# Patient Record
Sex: MALE | Race: WHITE | Employment: OTHER
[De-identification: names, ages, dates, MRNs, and addresses within clinical notes are randomized per-mention and may not be internally consistent; named-entity substitution may affect disease eponyms.]

---

## 2017-02-23 ENCOUNTER — OFFICE VISIT (OUTPATIENT)
Dept: INTERNAL MEDICINE | Facility: CLINIC | Age: 72
End: 2017-02-23

## 2017-02-23 VITALS
HEIGHT: 72 IN | DIASTOLIC BLOOD PRESSURE: 74 MMHG | BODY MASS INDEX: 21.14 KG/M2 | WEIGHT: 156.09 LBS | SYSTOLIC BLOOD PRESSURE: 122 MMHG

## 2017-02-23 DIAGNOSIS — E78.2 MIXED HYPERLIPIDEMIA: ICD-10-CM

## 2017-02-23 DIAGNOSIS — E11.65 TYPE 2 DIABETES MELLITUS WITH HYPERGLYCEMIA, UNSPECIFIED LONG TERM INSULIN USE STATUS: Primary | ICD-10-CM

## 2017-02-23 DIAGNOSIS — Z13.9 SCREENING: ICD-10-CM

## 2017-02-23 LAB
CHOLESTEROL, TOTAL: 148 MG/DL
CHOLESTEROL/HDL RATIO: 148
CREATININE URINE: 42.87 MG/DL
HDLC SERPL-MCNC: 49 MG/DL (ref 35–70)
LDL CHOLESTEROL CALCULATED: 89 MG/DL (ref 0–160)
MICROALBUMIN/CREAT 24H UR: 1.2 MG/G{CREAT}
TRIGL SERPL-MCNC: 49 MG/DL
VLDLC SERPL CALC-MCNC: NORMAL MG/DL

## 2017-02-23 PROCEDURE — 3045F PR MOST RECENT HEMOGLOBIN A1C LEVEL 7.0-9.0%: CPT | Performed by: INTERNAL MEDICINE

## 2017-02-23 PROCEDURE — 99213 OFFICE O/P EST LOW 20 MIN: CPT | Performed by: INTERNAL MEDICINE

## 2017-02-23 PROCEDURE — G8484 FLU IMMUNIZE NO ADMIN: HCPCS | Performed by: INTERNAL MEDICINE

## 2017-02-23 PROCEDURE — G8599 NO ASA/ANTIPLAT THER USE RNG: HCPCS | Performed by: INTERNAL MEDICINE

## 2017-02-23 PROCEDURE — 3017F COLORECTAL CA SCREEN DOC REV: CPT | Performed by: INTERNAL MEDICINE

## 2017-02-23 PROCEDURE — G8427 DOCREV CUR MEDS BY ELIG CLIN: HCPCS | Performed by: INTERNAL MEDICINE

## 2017-02-23 PROCEDURE — G8419 CALC BMI OUT NRM PARAM NOF/U: HCPCS | Performed by: INTERNAL MEDICINE

## 2017-02-23 PROCEDURE — 1123F ACP DISCUSS/DSCN MKR DOCD: CPT | Performed by: INTERNAL MEDICINE

## 2017-02-23 PROCEDURE — 4040F PNEUMOC VAC/ADMIN/RCVD: CPT | Performed by: INTERNAL MEDICINE

## 2017-02-23 PROCEDURE — 1036F TOBACCO NON-USER: CPT | Performed by: INTERNAL MEDICINE

## 2017-02-23 ASSESSMENT — PATIENT HEALTH QUESTIONNAIRE - PHQ9
SUM OF ALL RESPONSES TO PHQ9 QUESTIONS 1 & 2: 0
1. LITTLE INTEREST OR PLEASURE IN DOING THINGS: 0
SUM OF ALL RESPONSES TO PHQ QUESTIONS 1-9: 0
2. FEELING DOWN, DEPRESSED OR HOPELESS: 0

## 2017-02-28 DIAGNOSIS — E11.65 TYPE 2 DIABETES MELLITUS WITH HYPERGLYCEMIA, UNSPECIFIED LONG TERM INSULIN USE STATUS: ICD-10-CM

## 2017-02-28 DIAGNOSIS — E78.2 MIXED HYPERLIPIDEMIA: ICD-10-CM

## 2017-03-21 ENCOUNTER — APPOINTMENT (OUTPATIENT)
Dept: GENERAL RADIOLOGY | Age: 72
DRG: 870 | End: 2017-03-21
Payer: MEDICARE

## 2017-03-21 ENCOUNTER — APPOINTMENT (OUTPATIENT)
Dept: MRI IMAGING | Age: 72
DRG: 870 | End: 2017-03-21
Payer: MEDICARE

## 2017-03-21 ENCOUNTER — APPOINTMENT (OUTPATIENT)
Dept: CT IMAGING | Age: 72
DRG: 870 | End: 2017-03-21
Payer: MEDICARE

## 2017-03-21 ENCOUNTER — HOSPITAL ENCOUNTER (INPATIENT)
Age: 72
LOS: 15 days | Discharge: NURSING, REHAB OR LONG-TERM CARE | DRG: 870 | End: 2017-04-05
Attending: EMERGENCY MEDICINE | Admitting: INTERNAL MEDICINE
Payer: MEDICARE

## 2017-03-21 DIAGNOSIS — N39.0 URINARY TRACT INFECTION, SITE UNSPECIFIED: ICD-10-CM

## 2017-03-21 DIAGNOSIS — E87.20 LACTIC ACIDEMIA: ICD-10-CM

## 2017-03-21 DIAGNOSIS — E87.20 METABOLIC ACIDOSIS: ICD-10-CM

## 2017-03-21 DIAGNOSIS — Z97.8 ENDOTRACHEALLY INTUBATED: ICD-10-CM

## 2017-03-21 DIAGNOSIS — G40.901 STATUS EPILEPTICUS (HCC): Primary | ICD-10-CM

## 2017-03-21 DIAGNOSIS — E11.9 TYPE 2 DIABETES MELLITUS WITHOUT COMPLICATION, UNSPECIFIED LONG TERM INSULIN USE STATUS: ICD-10-CM

## 2017-03-21 DIAGNOSIS — E87.29 HIGH ANION GAP METABOLIC ACIDOSIS: ICD-10-CM

## 2017-03-21 DIAGNOSIS — R73.9 HYPERGLYCEMIA: ICD-10-CM

## 2017-03-21 PROBLEM — R56.9 SEIZURES (HCC): Status: ACTIVE | Noted: 2017-03-21

## 2017-03-21 PROBLEM — G93.41 METABOLIC ENCEPHALOPATHY: Status: ACTIVE | Noted: 2017-03-21

## 2017-03-21 PROBLEM — A41.9 SEPSIS (HCC): Status: ACTIVE | Noted: 2017-03-21

## 2017-03-21 LAB
% CKMB: 5.7 % (ref 0–3.5)
-: ABNORMAL
ABSOLUTE EOS #: 0 K/UL (ref 0–0.4)
ABSOLUTE EOS #: NORMAL K/UL
ABSOLUTE LYMPH #: 5.99 K/UL (ref 1–4.8)
ABSOLUTE LYMPH #: NORMAL K/UL
ABSOLUTE MONO #: 1.3 K/UL (ref 0.1–0.8)
ABSOLUTE MONO #: NORMAL K/UL
ACETAMINOPHEN LEVEL: <10 UG/ML (ref 10–30)
AMORPHOUS: ABNORMAL
AMPHETAMINE SCREEN URINE: NEGATIVE
ANION GAP SERPL CALCULATED.3IONS-SCNC: 20 MMOL/L (ref 9–17)
ANION GAP SERPL CALCULATED.3IONS-SCNC: 35 MMOL/L (ref 9–17)
ANION GAP: 34 MMOL/L (ref 8–16)
BACTERIA: ABNORMAL
BARBITURATE SCREEN URINE: NEGATIVE
BASOPHILS # BLD: 0 % (ref 0–2)
BASOPHILS # BLD: NORMAL % (ref 0–2)
BASOPHILS ABSOLUTE: 0 K/UL (ref 0–0.2)
BASOPHILS ABSOLUTE: NORMAL K/UL (ref 0–0.2)
BENZODIAZEPINE SCREEN, URINE: POSITIVE
BETA-HYDROXYBUTYRATE: 1.23 MMOL/L (ref 0.02–0.27)
BILIRUBIN URINE: NEGATIVE
BUN BLDV-MCNC: 8 MG/DL (ref 8–23)
BUN BLDV-MCNC: 9 MG/DL (ref 8–23)
BUN/CREAT BLD: ABNORMAL (ref 9–20)
BUN/CREAT BLD: ABNORMAL (ref 9–20)
BUPRENORPHINE URINE: ABNORMAL
CALCIUM SERPL-MCNC: 7.8 MG/DL (ref 8.6–10.4)
CALCIUM SERPL-MCNC: 8.4 MG/DL (ref 8.6–10.4)
CANNABINOID SCREEN URINE: POSITIVE
CARBOXYHEMOGLOBIN: 0 % (ref 0–5)
CASTS UA: ABNORMAL /LPF (ref 0–8)
CHLORIDE BLD-SCNC: 88 MMOL/L (ref 98–107)
CHLORIDE BLD-SCNC: 98 MMOL/L (ref 98–107)
CK MB: 2.6 NG/ML
CKMB INTERPRETATION: ABNORMAL
CO2: 12 MMOL/L (ref 20–31)
CO2: 19 MMOL/L (ref 20–31)
COCAINE METABOLITE, URINE: NEGATIVE
COLOR: YELLOW
COMMENT UA: ABNORMAL
CREAT SERPL-MCNC: 0.64 MG/DL (ref 0.7–1.2)
CREAT SERPL-MCNC: 1.01 MG/DL (ref 0.7–1.2)
CRYSTALS, UA: ABNORMAL /HPF
DIFFERENTIAL TYPE: ABNORMAL
DIFFERENTIAL TYPE: NORMAL
EOSINOPHILS RELATIVE PERCENT: 0 % (ref 1–4)
EOSINOPHILS RELATIVE PERCENT: NORMAL %
EPITHELIAL CELLS UA: ABNORMAL /HPF (ref 0–5)
ETHANOL PERCENT: <0.01 %
ETHANOL: <10 MG/DL
FIO2: 1
FIO2: 40
FIO2: ABNORMAL
GFR AFRICAN AMERICAN: >60 ML/MIN
GFR AFRICAN AMERICAN: >60 ML/MIN
GFR NON-AFRICAN AMERICAN: >60 ML/MIN
GFR NON-AFRICAN AMERICAN: >60 ML/MIN
GFR SERPL CREATININE-BSD FRML MDRD: ABNORMAL ML/MIN/{1.73_M2}
GLUCOSE BLD-MCNC: 234 MG/DL (ref 75–110)
GLUCOSE BLD-MCNC: 292 MG/DL (ref 75–110)
GLUCOSE BLD-MCNC: 297 MG/DL (ref 70–99)
GLUCOSE BLD-MCNC: 348 MG/DL (ref 75–110)
GLUCOSE BLD-MCNC: 450 MG/DL (ref 70–99)
GLUCOSE BLD-MCNC: 454 MG/DL (ref 74–106)
GLUCOSE URINE: ABNORMAL
HCO3 VENOUS: 6.7 MMOL/L (ref 24–30)
HCO3 VENOUS: 6.9 MMOL/L (ref 24–30)
HCT VFR BLD CALC: 42 % (ref 41–53)
HCT VFR BLD CALC: 42.9 % (ref 41–53)
HCT VFR BLD CALC: 52.6 % (ref 41–53)
HCT VFR BLD CALC: NORMAL %
HEMOGLOBIN: 14.7 G/DL (ref 13.5–17.5)
HEMOGLOBIN: 14.9 G/DL (ref 13.5–17.5)
HEMOGLOBIN: 16.5 G/DL (ref 13.5–17.5)
HEMOGLOBIN: NORMAL G/DL
INR BLD: 1.1
INR BLD: NORMAL
KETONES, URINE: NEGATIVE
LACTIC ACID, WHOLE BLOOD: 3 MMOL/L (ref 0.7–2.1)
LACTIC ACID, WHOLE BLOOD: 7.5 MMOL/L (ref 0.7–2.1)
LACTIC ACID: ABNORMAL MMOL/L
LEUKOCYTE ESTERASE, URINE: ABNORMAL
LYMPHOCYTES # BLD: 37 % (ref 24–44)
LYMPHOCYTES # BLD: NORMAL %
MAGNESIUM: 2.7 MG/DL (ref 1.6–2.6)
MCH RBC QN AUTO: 29.9 PG (ref 26–34)
MCH RBC QN AUTO: NORMAL PG (ref 26–34)
MCHC RBC AUTO-ENTMCNC: 31.3 G/DL (ref 31–37)
MCHC RBC AUTO-ENTMCNC: NORMAL G/DL (ref 31–37)
MCV RBC AUTO: 95.4 FL (ref 80–100)
MCV RBC AUTO: NORMAL FL (ref 80–100)
MDMA URINE: ABNORMAL
METHADONE SCREEN, URINE: NEGATIVE
METHAMPHETAMINE, URINE: ABNORMAL
MONOCYTES # BLD: 8 % (ref 1–7)
MONOCYTES # BLD: NORMAL %
MORPHOLOGY: ABNORMAL
MUCUS: ABNORMAL
MYOGLOBIN: 258 NG/ML (ref 28–72)
NEGATIVE BASE EXCESS, ART: 19 (ref 0–2)
NEGATIVE BASE EXCESS, ART: 6 (ref 0–2)
NEGATIVE BASE EXCESS, VEN: 28 (ref 0–2)
NEGATIVE BASE EXCESS, VEN: 29 (ref 0–2)
NITRITE, URINE: POSITIVE
O2 DEVICE/FLOW/%: ABNORMAL
O2 SAT, VEN: 80 %
OPIATES, URINE: NEGATIVE
OTHER OBSERVATIONS UA: ABNORMAL
OXYCODONE SCREEN URINE: NEGATIVE
PARTIAL THROMBOPLASTIN TIME: 29.1 SEC (ref 21.3–31.3)
PARTIAL THROMBOPLASTIN TIME: NORMAL SEC
PATIENT TEMP: ABNORMAL
PCO2, VEN: 49 MM HG (ref 39–55)
PCO2, VEN: 52 MM HG (ref 39–55)
PDW BLD-RTO: 16.2 % (ref 12.5–15.4)
PDW BLD-RTO: NORMAL %
PH UA: 5.5 (ref 5–8)
PH VENOUS: 6.72 (ref 7.32–7.42)
PH VENOUS: 6.76 (ref 7.32–7.42)
PHENCYCLIDINE, URINE: NEGATIVE
PHENYTOIN DATE LAST DOSE: ABNORMAL
PHENYTOIN DOSE AMOUNT: ABNORMAL
PHENYTOIN DOSE TIME: ABNORMAL
PHENYTOIN LEVEL: <0.8 UG/ML (ref 10–20)
PHOSPHORUS: 1.9 MG/DL (ref 2.5–4.5)
PLATELET # BLD: 286 K/UL (ref 140–450)
PLATELET # BLD: NORMAL K/UL
PLATELET ESTIMATE: ABNORMAL
PLATELET ESTIMATE: NORMAL
PMV BLD AUTO: 13.1 FL (ref 6–12)
PMV BLD AUTO: NORMAL FL (ref 6–12)
PO2, VEN: 90 MM HG (ref 30–50)
POC BUN: 12 MG/DL (ref 6–20)
POC CHLORIDE: 97 MMOL/L (ref 98–110)
POC CREATININE: 0.9 MG/DL (ref 0.6–1.4)
POC HCO3: 13.2 MMOL/L (ref 22–27)
POC HCO3: 19.7 MMOL/L (ref 22–27)
POC HEMATOCRIT: 54 % (ref 41–53)
POC HEMOGLOBIN: 18.4 GM/DL (ref 13.5–17.5)
POC LACTIC ACID, VEN: >20 MMOL/L (ref 0.9–1.7)
POC O2 SATURATION: 100 %
POC O2 SATURATION: 86 %
POC PCO2 TEMP: ABNORMAL MM HG
POC PCO2: 36 MM HG (ref 32–45)
POC PCO2: 62 MM HG (ref 32–45)
POC PH TEMP: ABNORMAL
POC PH: 6.94 (ref 7.35–7.45)
POC PH: 7.34 (ref 7.35–7.45)
POC PO2 TEMP: ABNORMAL MM HG
POC PO2: 54 MM HG (ref 75–95)
POC PO2: 543 MM HG (ref 75–95)
POC POTASSIUM: 5.6 MMOL/L (ref 3.5–5.1)
POC SODIUM: 132 MMOL/L (ref 136–145)
POC TROPONIN I: 0.02 NG/ML (ref 0–0.1)
POC TROPONIN INTERP: NORMAL
POSITIVE BASE EXCESS, ART: ABNORMAL (ref 0–2)
POSITIVE BASE EXCESS, ART: ABNORMAL (ref 0–2)
POSITIVE BASE EXCESS, VEN: ABNORMAL (ref 0–2)
POSITIVE BASE EXCESS, VEN: ABNORMAL (ref 0–2)
POTASSIUM SERPL-SCNC: 3.2 MMOL/L (ref 3.7–5.3)
POTASSIUM SERPL-SCNC: 3.8 MMOL/L (ref 3.7–5.3)
PROPOXYPHENE, URINE: ABNORMAL
PROTEIN UA: ABNORMAL
PROTHROMBIN TIME: 11.7 SEC (ref 9.4–12.6)
PROTHROMBIN TIME: NORMAL SEC
RBC # BLD: 5.52 M/UL (ref 4.5–5.9)
RBC # BLD: ABNORMAL 10*6/UL
RBC # BLD: NORMAL 10*6/UL
RBC # BLD: NORMAL M/UL (ref 4.5–5.9)
RBC UA: ABNORMAL /HPF (ref 0–4)
RENAL EPITHELIAL, UA: ABNORMAL /HPF
SALICYLATE LEVEL: <1 MG/DL (ref 3–10)
SEG NEUTROPHILS: 55 % (ref 36–66)
SEG NEUTROPHILS: NORMAL %
SEGMENTED NEUTROPHILS ABSOLUTE COUNT: 8.91 K/UL (ref 1.8–7.7)
SEGMENTED NEUTROPHILS ABSOLUTE COUNT: NORMAL K/UL
SERUM OSMOLALITY: 316 MOSM/KG (ref 275–295)
SODIUM BLD-SCNC: 135 MMOL/L (ref 135–144)
SODIUM BLD-SCNC: 137 MMOL/L (ref 135–144)
SPECIFIC GRAVITY UA: 1.02 (ref 1–1.03)
TCO2 (CALC), ART: 15 MM HG (ref 23–28)
TCO2 (CALC), ART: 21 MM HG (ref 23–28)
TEST INFORMATION: ABNORMAL
TOTAL CK: 46 U/L (ref 39–308)
TOTAL CO2, VENOUS: 8 MM HG (ref 25–31)
TOTAL CO2, VENOUS: 8 MM HG (ref 25–31)
TOXIC TRICYCLIC SC,BLOOD: NEGATIVE
TRICHOMONAS: ABNORMAL
TRICYCLIC ANTIDEPRESSANTS, UR: ABNORMAL
TROPONIN INTERP: NORMAL
TROPONIN T: <0.03 NG/ML
TURBIDITY: CLEAR
URINE HGB: ABNORMAL
UROBILINOGEN, URINE: NORMAL
WBC # BLD: 16.2 K/UL (ref 3.5–11)
WBC # BLD: ABNORMAL 10*3/UL
WBC # BLD: NORMAL 10*3/UL
WBC # BLD: NORMAL K/UL
WBC UA: ABNORMAL /HPF (ref 0–5)
YEAST: ABNORMAL

## 2017-03-21 PROCEDURE — 84100 ASSAY OF PHOSPHORUS: CPT

## 2017-03-21 PROCEDURE — 82803 BLOOD GASES ANY COMBINATION: CPT

## 2017-03-21 PROCEDURE — 80186 ASSAY OF PHENYTOIN FREE: CPT

## 2017-03-21 PROCEDURE — 2580000003 HC RX 258

## 2017-03-21 PROCEDURE — 6360000002 HC RX W HCPCS: Performed by: EMERGENCY MEDICINE

## 2017-03-21 PROCEDURE — 87086 URINE CULTURE/COLONY COUNT: CPT

## 2017-03-21 PROCEDURE — 99291 CRITICAL CARE FIRST HOUR: CPT | Performed by: INTERNAL MEDICINE

## 2017-03-21 PROCEDURE — 83735 ASSAY OF MAGNESIUM: CPT

## 2017-03-21 PROCEDURE — 82010 KETONE BODYS QUAN: CPT

## 2017-03-21 PROCEDURE — C9113 INJ PANTOPRAZOLE SODIUM, VIA: HCPCS | Performed by: INTERNAL MEDICINE

## 2017-03-21 PROCEDURE — 95951 HC EEG MONITORING VIDEO RECORDING: CPT

## 2017-03-21 PROCEDURE — 6360000002 HC RX W HCPCS

## 2017-03-21 PROCEDURE — 85014 HEMATOCRIT: CPT

## 2017-03-21 PROCEDURE — 85610 PROTHROMBIN TIME: CPT

## 2017-03-21 PROCEDURE — 6370000000 HC RX 637 (ALT 250 FOR IP): Performed by: INTERNAL MEDICINE

## 2017-03-21 PROCEDURE — 6360000002 HC RX W HCPCS: Performed by: PSYCHIATRY & NEUROLOGY

## 2017-03-21 PROCEDURE — 82565 ASSAY OF CREATININE: CPT

## 2017-03-21 PROCEDURE — 85025 COMPLETE CBC W/AUTO DIFF WBC: CPT

## 2017-03-21 PROCEDURE — 87040 BLOOD CULTURE FOR BACTERIA: CPT

## 2017-03-21 PROCEDURE — 36600 WITHDRAWAL OF ARTERIAL BLOOD: CPT

## 2017-03-21 PROCEDURE — 2500000003 HC RX 250 WO HCPCS: Performed by: EMERGENCY MEDICINE

## 2017-03-21 PROCEDURE — 80307 DRUG TEST PRSMV CHEM ANLYZR: CPT

## 2017-03-21 PROCEDURE — 85730 THROMBOPLASTIN TIME PARTIAL: CPT

## 2017-03-21 PROCEDURE — 2580000003 HC RX 258: Performed by: INTERNAL MEDICINE

## 2017-03-21 PROCEDURE — 82550 ASSAY OF CK (CPK): CPT

## 2017-03-21 PROCEDURE — 72125 CT NECK SPINE W/O DYE: CPT

## 2017-03-21 PROCEDURE — 85018 HEMOGLOBIN: CPT

## 2017-03-21 PROCEDURE — 99222 1ST HOSP IP/OBS MODERATE 55: CPT | Performed by: PSYCHIATRY & NEUROLOGY

## 2017-03-21 PROCEDURE — 94762 N-INVAS EAR/PLS OXIMTRY CONT: CPT

## 2017-03-21 PROCEDURE — 70551 MRI BRAIN STEM W/O DYE: CPT

## 2017-03-21 PROCEDURE — 70496 CT ANGIOGRAPHY HEAD: CPT

## 2017-03-21 PROCEDURE — 2580000003 HC RX 258: Performed by: EMERGENCY MEDICINE

## 2017-03-21 PROCEDURE — 74000 XR ABDOMEN LIMITED (KUB): CPT

## 2017-03-21 PROCEDURE — 70450 CT HEAD/BRAIN W/O DYE: CPT

## 2017-03-21 PROCEDURE — 36415 COLL VENOUS BLD VENIPUNCTURE: CPT

## 2017-03-21 PROCEDURE — 2580000003 HC RX 258: Performed by: PSYCHIATRY & NEUROLOGY

## 2017-03-21 PROCEDURE — 84520 ASSAY OF UREA NITROGEN: CPT

## 2017-03-21 PROCEDURE — 84484 ASSAY OF TROPONIN QUANT: CPT

## 2017-03-21 PROCEDURE — 96375 TX/PRO/DX INJ NEW DRUG ADDON: CPT

## 2017-03-21 PROCEDURE — 2500000003 HC RX 250 WO HCPCS

## 2017-03-21 PROCEDURE — 6360000002 HC RX W HCPCS: Performed by: INTERNAL MEDICINE

## 2017-03-21 PROCEDURE — 87483 CNS DNA AMP PROBE TYPE 12-25: CPT

## 2017-03-21 PROCEDURE — G0480 DRUG TEST DEF 1-7 CLASSES: HCPCS

## 2017-03-21 PROCEDURE — 86403 PARTICLE AGGLUT ANTBDY SCRN: CPT

## 2017-03-21 PROCEDURE — 81001 URINALYSIS AUTO W/SCOPE: CPT

## 2017-03-21 PROCEDURE — 87186 SC STD MICRODIL/AGAR DIL: CPT

## 2017-03-21 PROCEDURE — 71010 XR CHEST PORTABLE: CPT

## 2017-03-21 PROCEDURE — 94770 HC ETCO2 MONITOR DAILY: CPT

## 2017-03-21 PROCEDURE — 84132 ASSAY OF SERUM POTASSIUM: CPT

## 2017-03-21 PROCEDURE — 82435 ASSAY OF BLOOD CHLORIDE: CPT

## 2017-03-21 PROCEDURE — 96365 THER/PROPH/DIAG IV INF INIT: CPT

## 2017-03-21 PROCEDURE — 80048 BASIC METABOLIC PNL TOTAL CA: CPT

## 2017-03-21 PROCEDURE — 70498 CT ANGIOGRAPHY NECK: CPT

## 2017-03-21 PROCEDURE — 6360000004 HC RX CONTRAST MEDICATION: Performed by: EMERGENCY MEDICINE

## 2017-03-21 PROCEDURE — 84295 ASSAY OF SERUM SODIUM: CPT

## 2017-03-21 PROCEDURE — 80185 ASSAY OF PHENYTOIN TOTAL: CPT

## 2017-03-21 PROCEDURE — 83605 ASSAY OF LACTIC ACID: CPT

## 2017-03-21 PROCEDURE — 83874 ASSAY OF MYOGLOBIN: CPT

## 2017-03-21 PROCEDURE — 94002 VENT MGMT INPAT INIT DAY: CPT

## 2017-03-21 PROCEDURE — 93005 ELECTROCARDIOGRAM TRACING: CPT

## 2017-03-21 PROCEDURE — 31500 INSERT EMERGENCY AIRWAY: CPT

## 2017-03-21 PROCEDURE — 82947 ASSAY GLUCOSE BLOOD QUANT: CPT

## 2017-03-21 PROCEDURE — 2000000000 HC ICU R&B

## 2017-03-21 PROCEDURE — 82553 CREATINE MB FRACTION: CPT

## 2017-03-21 PROCEDURE — 82375 ASSAY CARBOXYHB QUANT: CPT

## 2017-03-21 PROCEDURE — 99223 1ST HOSP IP/OBS HIGH 75: CPT | Performed by: PSYCHIATRY & NEUROLOGY

## 2017-03-21 PROCEDURE — 99285 EMERGENCY DEPT VISIT HI MDM: CPT

## 2017-03-21 PROCEDURE — 83930 ASSAY OF BLOOD OSMOLALITY: CPT

## 2017-03-21 PROCEDURE — 85027 COMPLETE CBC AUTOMATED: CPT

## 2017-03-21 RX ORDER — PHENYTOIN SODIUM 50 MG/ML
100 INJECTION, SOLUTION INTRAMUSCULAR; INTRAVENOUS EVERY 8 HOURS
Status: DISCONTINUED | OUTPATIENT
Start: 2017-03-21 | End: 2017-04-02

## 2017-03-21 RX ORDER — PROPOFOL 10 MG/ML
INJECTION, EMULSION INTRAVENOUS
Status: COMPLETED
Start: 2017-03-21 | End: 2017-03-21

## 2017-03-21 RX ORDER — 0.9 % SODIUM CHLORIDE 0.9 %
2000 INTRAVENOUS SOLUTION INTRAVENOUS ONCE
Status: COMPLETED | OUTPATIENT
Start: 2017-03-21 | End: 2017-03-22

## 2017-03-21 RX ORDER — SODIUM CHLORIDE 0.9 % (FLUSH) 0.9 %
10 SYRINGE (ML) INJECTION EVERY 12 HOURS SCHEDULED
Status: DISCONTINUED | OUTPATIENT
Start: 2017-03-21 | End: 2017-04-05 | Stop reason: HOSPADM

## 2017-03-21 RX ORDER — 0.9 % SODIUM CHLORIDE 0.9 %
15 INTRAVENOUS SOLUTION INTRAVENOUS ONCE
Status: COMPLETED | OUTPATIENT
Start: 2017-03-21 | End: 2017-03-21

## 2017-03-21 RX ORDER — ROCURONIUM BROMIDE 10 MG/ML
100 INJECTION, SOLUTION INTRAVENOUS ONCE
Status: COMPLETED | OUTPATIENT
Start: 2017-03-21 | End: 2017-03-21

## 2017-03-21 RX ORDER — POTASSIUM CHLORIDE 7.45 MG/ML
10 INJECTION INTRAVENOUS PRN
Status: DISCONTINUED | OUTPATIENT
Start: 2017-03-21 | End: 2017-03-30

## 2017-03-21 RX ORDER — HEPARIN SODIUM 5000 [USP'U]/ML
5000 INJECTION, SOLUTION INTRAVENOUS; SUBCUTANEOUS EVERY 8 HOURS SCHEDULED
Status: DISCONTINUED | OUTPATIENT
Start: 2017-03-21 | End: 2017-03-31

## 2017-03-21 RX ORDER — PROPOFOL 10 MG/ML
10 INJECTION, EMULSION INTRAVENOUS
Status: DISCONTINUED | OUTPATIENT
Start: 2017-03-21 | End: 2017-03-23

## 2017-03-21 RX ORDER — DEXTROSE MONOHYDRATE 25 G/50ML
12.5 INJECTION, SOLUTION INTRAVENOUS PRN
Status: DISCONTINUED | OUTPATIENT
Start: 2017-03-21 | End: 2017-04-05 | Stop reason: HOSPADM

## 2017-03-21 RX ORDER — FENTANYL CITRATE 50 UG/ML
25 INJECTION, SOLUTION INTRAMUSCULAR; INTRAVENOUS
Status: DISCONTINUED | OUTPATIENT
Start: 2017-03-21 | End: 2017-03-22

## 2017-03-21 RX ORDER — MIDAZOLAM HYDROCHLORIDE 5 MG/ML
INJECTION INTRAMUSCULAR; INTRAVENOUS
Status: DISPENSED
Start: 2017-03-21 | End: 2017-03-22

## 2017-03-21 RX ORDER — LORAZEPAM 2 MG/ML
2 INJECTION INTRAMUSCULAR PRN
Status: DISCONTINUED | OUTPATIENT
Start: 2017-03-21 | End: 2017-04-02

## 2017-03-21 RX ORDER — LORAZEPAM 2 MG/ML
1 INJECTION INTRAMUSCULAR PRN
Status: DISCONTINUED | OUTPATIENT
Start: 2017-03-21 | End: 2017-04-05 | Stop reason: HOSPADM

## 2017-03-21 RX ORDER — SODIUM CHLORIDE 9 MG/ML
INJECTION, SOLUTION INTRAVENOUS CONTINUOUS
Status: DISCONTINUED | OUTPATIENT
Start: 2017-03-21 | End: 2017-03-23

## 2017-03-21 RX ORDER — SODIUM CHLORIDE 0.9 % (FLUSH) 0.9 %
10 SYRINGE (ML) INJECTION PRN
Status: DISCONTINUED | OUTPATIENT
Start: 2017-03-21 | End: 2017-04-05 | Stop reason: HOSPADM

## 2017-03-21 RX ORDER — 0.9 % SODIUM CHLORIDE 0.9 %
30 INTRAVENOUS SOLUTION INTRAVENOUS ONCE
Status: COMPLETED | OUTPATIENT
Start: 2017-03-21 | End: 2017-03-21

## 2017-03-21 RX ORDER — ONDANSETRON 2 MG/ML
4 INJECTION INTRAMUSCULAR; INTRAVENOUS EVERY 6 HOURS PRN
Status: DISCONTINUED | OUTPATIENT
Start: 2017-03-21 | End: 2017-04-05 | Stop reason: HOSPADM

## 2017-03-21 RX ORDER — FENTANYL CITRATE 50 UG/ML
50 INJECTION, SOLUTION INTRAMUSCULAR; INTRAVENOUS
Status: DISCONTINUED | OUTPATIENT
Start: 2017-03-21 | End: 2017-03-22

## 2017-03-21 RX ORDER — VECURONIUM BROMIDE 1 MG/ML
5 INJECTION, POWDER, LYOPHILIZED, FOR SOLUTION INTRAVENOUS ONCE
Status: DISCONTINUED | OUTPATIENT
Start: 2017-03-21 | End: 2017-03-28

## 2017-03-21 RX ORDER — MIDAZOLAM HYDROCHLORIDE 5 MG/ML
10 INJECTION INTRAMUSCULAR; INTRAVENOUS ONCE
Status: COMPLETED | OUTPATIENT
Start: 2017-03-21 | End: 2017-03-21

## 2017-03-21 RX ORDER — DEXTROSE AND SODIUM CHLORIDE 5; .45 G/100ML; G/100ML
INJECTION, SOLUTION INTRAVENOUS CONTINUOUS PRN
Status: DISCONTINUED | OUTPATIENT
Start: 2017-03-21 | End: 2017-03-22

## 2017-03-21 RX ORDER — SODIUM CHLORIDE 9 MG/ML
INJECTION, SOLUTION INTRAVENOUS
Status: COMPLETED
Start: 2017-03-21 | End: 2017-03-21

## 2017-03-21 RX ORDER — LEVOFLOXACIN 5 MG/ML
750 INJECTION, SOLUTION INTRAVENOUS EVERY 24 HOURS
Status: DISCONTINUED | OUTPATIENT
Start: 2017-03-21 | End: 2017-03-25

## 2017-03-21 RX ADMIN — MIDAZOLAM HYDROCHLORIDE 10 MG: 5 INJECTION, SOLUTION INTRAMUSCULAR; INTRAVENOUS at 15:05

## 2017-03-21 RX ADMIN — HYDROXOCOBALAMIN 5 G: 5 INJECTION, POWDER, LYOPHILIZED, FOR SOLUTION INTRAVENOUS at 16:13

## 2017-03-21 RX ADMIN — SODIUM CHLORIDE 1089 ML: 9 INJECTION, SOLUTION INTRAVENOUS at 22:16

## 2017-03-21 RX ADMIN — LEVETIRACETAM 500 MG: 500 INJECTION, SOLUTION, CONCENTRATE INTRAVENOUS at 22:22

## 2017-03-21 RX ADMIN — SODIUM CHLORIDE: 9 INJECTION, SOLUTION INTRAVENOUS at 16:14

## 2017-03-21 RX ADMIN — LEVETIRACETAM 1000 MG: 500 INJECTION, SOLUTION, CONCENTRATE INTRAVENOUS at 15:49

## 2017-03-21 RX ADMIN — Medication 1 MG/HR: at 17:00

## 2017-03-21 RX ADMIN — ROCURONIUM BROMIDE 100 MG: 10 INJECTION INTRAVENOUS at 15:00

## 2017-03-21 RX ADMIN — PROPOFOL 25 MCG/KG/MIN: 10 INJECTION, EMULSION INTRAVENOUS at 18:00

## 2017-03-21 RX ADMIN — Medication 10 ML: at 21:03

## 2017-03-21 RX ADMIN — SODIUM CHLORIDE: 9 INJECTION, SOLUTION INTRAVENOUS at 22:16

## 2017-03-21 RX ADMIN — IOHEXOL 90 ML: 350 INJECTION, SOLUTION INTRAVENOUS at 15:45

## 2017-03-21 RX ADMIN — SODIUM CHLORIDE 80 MG: 9 INJECTION, SOLUTION INTRAVENOUS at 21:07

## 2017-03-21 RX ADMIN — FENTANYL CITRATE 50 MCG: 50 INJECTION, SOLUTION INTRAMUSCULAR; INTRAVENOUS at 21:20

## 2017-03-21 RX ADMIN — SODIUM CHLORIDE 8.64 UNITS/HR: 9 INJECTION, SOLUTION INTRAVENOUS at 21:00

## 2017-03-21 RX ADMIN — Medication 10 ML: at 21:00

## 2017-03-21 RX ADMIN — SODIUM BICARBONATE 50 MEQ: 84 INJECTION PARENTERAL at 15:19

## 2017-03-21 RX ADMIN — SODIUM CHLORIDE 8 MG/HR: 9 INJECTION, SOLUTION INTRAVENOUS at 21:31

## 2017-03-21 RX ADMIN — SODIUM CHLORIDE 2178 ML: 9 INJECTION, SOLUTION INTRAVENOUS at 17:55

## 2017-03-21 RX ADMIN — PROPOFOL 40 MCG/KG/MIN: 10 INJECTION, EMULSION INTRAVENOUS at 21:41

## 2017-03-21 RX ADMIN — SODIUM BICARBONATE: 84 INJECTION PARENTERAL at 15:31

## 2017-03-21 ASSESSMENT — PULMONARY FUNCTION TESTS
PIF_VALUE: 17
PIF_VALUE: 20
PIF_VALUE: 9
PIF_VALUE: 15

## 2017-03-22 ENCOUNTER — APPOINTMENT (OUTPATIENT)
Dept: GENERAL RADIOLOGY | Age: 72
DRG: 870 | End: 2017-03-22
Payer: MEDICARE

## 2017-03-22 ENCOUNTER — APPOINTMENT (OUTPATIENT)
Dept: INTERVENTIONAL RADIOLOGY/VASCULAR | Age: 72
DRG: 870 | End: 2017-03-22
Payer: MEDICARE

## 2017-03-22 LAB
ABSOLUTE EOS #: 0 K/UL (ref 0–0.4)
ABSOLUTE LYMPH #: 1.74 K/UL (ref 1–4.8)
ABSOLUTE MONO #: 1.16 K/UL (ref 0.1–0.8)
ALBUMIN CSF: 648 MG/L (ref 70–350)
ALBUMIN INDEX: 166.2
ALBUMIN SERPL-MCNC: 3.9 G/DL (ref 3.5–5.2)
ANION GAP SERPL CALCULATED.3IONS-SCNC: 14 MMOL/L (ref 9–17)
ANION GAP SERPL CALCULATED.3IONS-SCNC: 16 MMOL/L (ref 9–17)
ANION GAP SERPL CALCULATED.3IONS-SCNC: 17 MMOL/L (ref 9–17)
ANION GAP SERPL CALCULATED.3IONS-SCNC: 18 MMOL/L (ref 9–17)
APPEARANCE CSF: CLEAR
BASOPHILS # BLD: 1 % (ref 0–2)
BASOPHILS ABSOLUTE: 0.19 K/UL (ref 0–0.2)
BUN BLDV-MCNC: 3 MG/DL (ref 8–23)
BUN BLDV-MCNC: 4 MG/DL (ref 8–23)
BUN BLDV-MCNC: 4 MG/DL (ref 8–23)
BUN BLDV-MCNC: 6 MG/DL (ref 8–23)
BUN/CREAT BLD: ABNORMAL (ref 9–20)
CALCIUM IONIZED: 1 MMOL/L (ref 1.13–1.33)
CALCIUM SERPL-MCNC: 7.5 MG/DL (ref 8.6–10.4)
CALCIUM SERPL-MCNC: 7.6 MG/DL (ref 8.6–10.4)
CALCIUM SERPL-MCNC: 7.7 MG/DL (ref 8.6–10.4)
CALCIUM SERPL-MCNC: 7.8 MG/DL (ref 8.6–10.4)
CHLORIDE BLD-SCNC: 100 MMOL/L (ref 98–107)
CHLORIDE BLD-SCNC: 101 MMOL/L (ref 98–107)
CHLORIDE BLD-SCNC: 98 MMOL/L (ref 98–107)
CHLORIDE BLD-SCNC: 99 MMOL/L (ref 98–107)
CO2: 22 MMOL/L (ref 20–31)
CO2: 22 MMOL/L (ref 20–31)
CO2: 23 MMOL/L (ref 20–31)
CO2: 23 MMOL/L (ref 20–31)
CREAT SERPL-MCNC: 0.44 MG/DL (ref 0.7–1.2)
CREAT SERPL-MCNC: 0.53 MG/DL (ref 0.7–1.2)
CREAT SERPL-MCNC: 0.54 MG/DL (ref 0.7–1.2)
CREAT SERPL-MCNC: 0.57 MG/DL (ref 0.7–1.2)
CRYPTOCOCCUS NEOFORMANS/GATTI CSF FILM ARR.: NOT DETECTED
CYTOMEGALOVIRUS (CMV) CSF FILM ARRAY: NOT DETECTED
DIFFERENTIAL TYPE: ABNORMAL
DIRECT EXAM: NORMAL
EKG ATRIAL RATE: 138 BPM
EKG ATRIAL RATE: 57 BPM
EKG ATRIAL RATE: 85 BPM
EKG P AXIS: 81 DEGREES
EKG P AXIS: 82 DEGREES
EKG P-R INTERVAL: 160 MS
EKG P-R INTERVAL: 168 MS
EKG P-R INTERVAL: 170 MS
EKG Q-T INTERVAL: 268 MS
EKG Q-T INTERVAL: 408 MS
EKG Q-T INTERVAL: 418 MS
EKG QRS DURATION: 82 MS
EKG QRS DURATION: 84 MS
EKG QRS DURATION: 96 MS
EKG QTC CALCULATION (BAZETT): 260 MS
EKG QTC CALCULATION (BAZETT): 497 MS
EKG QTC CALCULATION (BAZETT): 618 MS
EKG R AXIS: 50 DEGREES
EKG R AXIS: 53 DEGREES
EKG R AXIS: 55 DEGREES
EKG T AXIS: 107 DEGREES
EKG T AXIS: 114 DEGREES
EKG T AXIS: 83 DEGREES
EKG VENTRICULAR RATE: 138 BPM
EKG VENTRICULAR RATE: 57 BPM
EKG VENTRICULAR RATE: 85 BPM
ENTEROVIRUS CSF FILM ARRAY: NOT DETECTED
EOSINOPHILS RELATIVE PERCENT: 0 % (ref 1–4)
ESCHERICHIA COLI K1 CSF FILM ARRAY: NOT DETECTED
FIO2: 60
GFR AFRICAN AMERICAN: >60 ML/MIN
GFR NON-AFRICAN AMERICAN: >60 ML/MIN
GFR SERPL CREATININE-BSD FRML MDRD: ABNORMAL ML/MIN/{1.73_M2}
GLUCOSE BLD-MCNC: 116 MG/DL (ref 75–110)
GLUCOSE BLD-MCNC: 135 MG/DL (ref 75–110)
GLUCOSE BLD-MCNC: 148 MG/DL (ref 75–110)
GLUCOSE BLD-MCNC: 153 MG/DL (ref 75–110)
GLUCOSE BLD-MCNC: 161 MG/DL (ref 75–110)
GLUCOSE BLD-MCNC: 165 MG/DL (ref 75–110)
GLUCOSE BLD-MCNC: 167 MG/DL (ref 75–110)
GLUCOSE BLD-MCNC: 171 MG/DL (ref 75–110)
GLUCOSE BLD-MCNC: 173 MG/DL (ref 70–99)
GLUCOSE BLD-MCNC: 176 MG/DL (ref 75–110)
GLUCOSE BLD-MCNC: 177 MG/DL (ref 75–110)
GLUCOSE BLD-MCNC: 181 MG/DL (ref 75–110)
GLUCOSE BLD-MCNC: 183 MG/DL (ref 75–110)
GLUCOSE BLD-MCNC: 193 MG/DL (ref 75–110)
GLUCOSE BLD-MCNC: 198 MG/DL (ref 75–110)
GLUCOSE BLD-MCNC: 211 MG/DL (ref 70–99)
GLUCOSE BLD-MCNC: 218 MG/DL (ref 70–99)
GLUCOSE BLD-MCNC: 228 MG/DL (ref 70–99)
GLUCOSE BLD-MCNC: 84 MG/DL (ref 75–110)
GLUCOSE, CSF: 119 MG/DL (ref 40–70)
HAEMOPHILUS INFLUENZA CSF FILM ARRAY: NOT DETECTED
HCT VFR BLD CALC: 41.5 % (ref 41–53)
HCT VFR BLD CALC: 43.4 % (ref 41–53)
HCT VFR BLD CALC: 43.8 % (ref 41–53)
HEMOGLOBIN: 14.3 G/DL (ref 13.5–17.5)
HEMOGLOBIN: 15.1 G/DL (ref 13.5–17.5)
HEMOGLOBIN: 15.1 G/DL (ref 13.5–17.5)
HHV-6 (HERPESVIRUS 6) CSF FILM ARRAY: NOT DETECTED
HSV-1 CSF FILM ARRAY: NOT DETECTED
HSV-2 CSF FILM ARRAY: NOT DETECTED
IGG CSF: 6.6 MG/DL (ref 0.5–7)
IGG INDEX CSF: 0.58
IGG SYNTHESIS RATE CSF: 5.7 MG/24 H
IGG: 684 MG/DL (ref 700–1600)
LACTIC ACID, WHOLE BLOOD: 2 MMOL/L (ref 0.7–2.1)
LACTIC ACID, WHOLE BLOOD: 2.1 MMOL/L (ref 0.7–2.1)
LISTERIA MONOCYTOGENES CSF FILM ARRAY: NOT DETECTED
LYMPHOCYTES # BLD: 9 % (ref 24–44)
Lab: NORMAL
Lab: NORMAL
MAGNESIUM: 1.8 MG/DL (ref 1.6–2.6)
MAGNESIUM: 2 MG/DL (ref 1.6–2.6)
MAGNESIUM: 2.1 MG/DL (ref 1.6–2.6)
MAGNESIUM: 2.1 MG/DL (ref 1.6–2.6)
MAGNESIUM: 2.3 MG/DL (ref 1.6–2.6)
MCH RBC QN AUTO: 30.3 PG (ref 26–34)
MCHC RBC AUTO-ENTMCNC: 34.5 G/DL (ref 31–37)
MCV RBC AUTO: 87.9 FL (ref 80–100)
MONOCYTES # BLD: 6 % (ref 1–7)
MORPHOLOGY: NORMAL
NEGATIVE BASE EXCESS, ART: ABNORMAL (ref 0–2)
NEISSERIA MENIGITIDIS CSF FILM ARRAY: NOT DETECTED
O2 DEVICE/FLOW/%: ABNORMAL
OLIGOCLONAL BANDS: ABNORMAL
PARECHOVIRUS CSF FILM ARRAY: NOT DETECTED
PATIENT TEMP: ABNORMAL
PDW BLD-RTO: 14.3 % (ref 12.5–15.4)
PHENYTOIN DATE LAST DOSE: NORMAL
PHENYTOIN DOSE AMOUNT: NORMAL
PHENYTOIN DOSE TIME: NORMAL
PHENYTOIN FREE: 1.3 UG/ML (ref 1–2)
PHENYTOIN FREE: <0.1 UG/ML (ref 1–2)
PHENYTOIN LEVEL: 13.7 UG/ML (ref 10–20)
PHOSPHORUS: 1.9 MG/DL (ref 2.5–4.5)
PHOSPHORUS: 2.2 MG/DL (ref 2.5–4.5)
PHOSPHORUS: 2.3 MG/DL (ref 2.5–4.5)
PHOSPHORUS: 2.5 MG/DL (ref 2.5–4.5)
PHOSPHORUS: 3.1 MG/DL (ref 2.5–4.5)
PLATELET # BLD: 198 K/UL (ref 140–450)
PLATELET ESTIMATE: ABNORMAL
PMV BLD AUTO: 9.8 FL (ref 6–12)
POC HCO3: 24.8 MMOL/L (ref 22–27)
POC O2 SATURATION: 100 %
POC PCO2 TEMP: ABNORMAL MM HG
POC PCO2: 38 MM HG (ref 32–45)
POC PH TEMP: ABNORMAL
POC PH: 7.43 (ref 7.35–7.45)
POC PO2 TEMP: ABNORMAL MM HG
POC PO2: 188 MM HG (ref 75–95)
POSITIVE BASE EXCESS, ART: 0 (ref 0–2)
POTASSIUM SERPL-SCNC: 2.9 MMOL/L (ref 3.7–5.3)
POTASSIUM SERPL-SCNC: 2.9 MMOL/L (ref 3.7–5.3)
POTASSIUM SERPL-SCNC: 3.1 MMOL/L (ref 3.7–5.3)
POTASSIUM SERPL-SCNC: 3.2 MMOL/L (ref 3.7–5.3)
PROTEIN CSF: 97.1 MG/DL (ref 15–45)
RBC # BLD: 4.98 M/UL (ref 4.5–5.9)
RBC # BLD: ABNORMAL 10*6/UL
RBC CSF: 0 /MM3
SEG NEUTROPHILS: 84 % (ref 36–66)
SEGMENTED NEUTROPHILS ABSOLUTE COUNT: 16.21 K/UL (ref 1.8–7.7)
SODIUM BLD-SCNC: 137 MMOL/L (ref 135–144)
SODIUM BLD-SCNC: 138 MMOL/L (ref 135–144)
SODIUM BLD-SCNC: 138 MMOL/L (ref 135–144)
SODIUM BLD-SCNC: 140 MMOL/L (ref 135–144)
SOURCE: NORMAL
SPECIMEN DESCRIPTION: NORMAL
SPECIMEN DESCRIPTION: NORMAL
STATUS: NORMAL
STATUS: NORMAL
STREPTOCOCCUS AGALACTIAE CSF FILM ARRAY: NOT DETECTED
STREPTOCOCCUS PNEUMONIAE CSF FILM ARRAY: NOT DETECTED
SUPERNAT COLOR CSF: NORMAL
TCO2 (CALC), ART: 26 MM HG (ref 23–28)
TOTAL CK: 609 U/L (ref 39–308)
TUBE NUMBER CSF: 3
VARICELLA-ZOSTER CSF FILM ARRAY: NOT DETECTED
VOLUME CSF: 7
WBC # BLD: 19.3 K/UL (ref 3.5–11)
WBC # BLD: ABNORMAL 10*3/UL
WBC CSF: 0 /MM3
XANTHOCHROMIA: NORMAL

## 2017-03-22 PROCEDURE — 6360000002 HC RX W HCPCS: Performed by: PSYCHIATRY & NEUROLOGY

## 2017-03-22 PROCEDURE — 2580000003 HC RX 258: Performed by: HOSPITALIST

## 2017-03-22 PROCEDURE — 85018 HEMOGLOBIN: CPT

## 2017-03-22 PROCEDURE — 82042 OTHER SOURCE ALBUMIN QUAN EA: CPT

## 2017-03-22 PROCEDURE — 99233 SBSQ HOSP IP/OBS HIGH 50: CPT | Performed by: PSYCHIATRY & NEUROLOGY

## 2017-03-22 PROCEDURE — 82330 ASSAY OF CALCIUM: CPT

## 2017-03-22 PROCEDURE — 86592 SYPHILIS TEST NON-TREP QUAL: CPT

## 2017-03-22 PROCEDURE — 94762 N-INVAS EAR/PLS OXIMTRY CONT: CPT

## 2017-03-22 PROCEDURE — 36415 COLL VENOUS BLD VENIPUNCTURE: CPT

## 2017-03-22 PROCEDURE — 80185 ASSAY OF PHENYTOIN TOTAL: CPT

## 2017-03-22 PROCEDURE — 84100 ASSAY OF PHOSPHORUS: CPT

## 2017-03-22 PROCEDURE — 83735 ASSAY OF MAGNESIUM: CPT

## 2017-03-22 PROCEDURE — 6360000002 HC RX W HCPCS: Performed by: EMERGENCY MEDICINE

## 2017-03-22 PROCEDURE — 009U3ZX DRAINAGE OF SPINAL CANAL, PERCUTANEOUS APPROACH, DIAGNOSTIC: ICD-10-PCS | Performed by: RADIOLOGY

## 2017-03-22 PROCEDURE — 80048 BASIC METABOLIC PNL TOTAL CA: CPT

## 2017-03-22 PROCEDURE — 95951 HC EEG MONITORING VIDEO RECORDING: CPT

## 2017-03-22 PROCEDURE — 06H033Z INSERTION OF INFUSION DEVICE INTO INFERIOR VENA CAVA, PERCUTANEOUS APPROACH: ICD-10-PCS | Performed by: INTERNAL MEDICINE

## 2017-03-22 PROCEDURE — 83605 ASSAY OF LACTIC ACID: CPT

## 2017-03-22 PROCEDURE — 6370000000 HC RX 637 (ALT 250 FOR IP): Performed by: INTERNAL MEDICINE

## 2017-03-22 PROCEDURE — 83873 ASSAY OF CSF PROTEIN: CPT

## 2017-03-22 PROCEDURE — 82947 ASSAY GLUCOSE BLOOD QUANT: CPT

## 2017-03-22 PROCEDURE — 2580000003 HC RX 258: Performed by: PSYCHIATRY & NEUROLOGY

## 2017-03-22 PROCEDURE — 82945 GLUCOSE OTHER FLUID: CPT

## 2017-03-22 PROCEDURE — 2580000003 HC RX 258: Performed by: EMERGENCY MEDICINE

## 2017-03-22 PROCEDURE — 62270 DX LMBR SPI PNXR: CPT

## 2017-03-22 PROCEDURE — 82784 ASSAY IGA/IGD/IGG/IGM EACH: CPT

## 2017-03-22 PROCEDURE — 82550 ASSAY OF CK (CPK): CPT

## 2017-03-22 PROCEDURE — 2580000003 HC RX 258: Performed by: INTERNAL MEDICINE

## 2017-03-22 PROCEDURE — 83916 OLIGOCLONAL BANDS: CPT

## 2017-03-22 PROCEDURE — 87205 SMEAR GRAM STAIN: CPT

## 2017-03-22 PROCEDURE — 82803 BLOOD GASES ANY COMBINATION: CPT

## 2017-03-22 PROCEDURE — 74000 XR ABDOMEN LIMITED (KUB): CPT

## 2017-03-22 PROCEDURE — 6370000000 HC RX 637 (ALT 250 FOR IP): Performed by: HOSPITALIST

## 2017-03-22 PROCEDURE — 82040 ASSAY OF SERUM ALBUMIN: CPT

## 2017-03-22 PROCEDURE — 2000000000 HC ICU R&B

## 2017-03-22 PROCEDURE — 87327 CRYPTOCOCCUS NEOFORM AG IA: CPT

## 2017-03-22 PROCEDURE — 89050 BODY FLUID CELL COUNT: CPT

## 2017-03-22 PROCEDURE — 87070 CULTURE OTHR SPECIMN AEROBIC: CPT

## 2017-03-22 PROCEDURE — 85025 COMPLETE CBC W/AUTO DIFF WBC: CPT

## 2017-03-22 PROCEDURE — 85014 HEMATOCRIT: CPT

## 2017-03-22 PROCEDURE — C9113 INJ PANTOPRAZOLE SODIUM, VIA: HCPCS | Performed by: INTERNAL MEDICINE

## 2017-03-22 PROCEDURE — 6360000002 HC RX W HCPCS: Performed by: INTERNAL MEDICINE

## 2017-03-22 PROCEDURE — 80186 ASSAY OF PHENYTOIN FREE: CPT

## 2017-03-22 PROCEDURE — 2500000003 HC RX 250 WO HCPCS: Performed by: INTERNAL MEDICINE

## 2017-03-22 PROCEDURE — 93005 ELECTROCARDIOGRAM TRACING: CPT

## 2017-03-22 PROCEDURE — 99291 CRITICAL CARE FIRST HOUR: CPT | Performed by: INTERNAL MEDICINE

## 2017-03-22 PROCEDURE — 84157 ASSAY OF PROTEIN OTHER: CPT

## 2017-03-22 RX ORDER — POTASSIUM CHLORIDE 7.45 MG/ML
10 INJECTION INTRAVENOUS
Status: COMPLETED | OUTPATIENT
Start: 2017-03-22 | End: 2017-03-22

## 2017-03-22 RX ORDER — FENTANYL CITRATE 50 UG/ML
100 INJECTION, SOLUTION INTRAMUSCULAR; INTRAVENOUS
Status: DISCONTINUED | OUTPATIENT
Start: 2017-03-22 | End: 2017-04-03

## 2017-03-22 RX ORDER — NICOTINE POLACRILEX 4 MG
15 LOZENGE BUCCAL PRN
Status: DISCONTINUED | OUTPATIENT
Start: 2017-03-22 | End: 2017-04-05 | Stop reason: HOSPADM

## 2017-03-22 RX ORDER — FENTANYL CITRATE 50 UG/ML
50 INJECTION, SOLUTION INTRAMUSCULAR; INTRAVENOUS
Status: DISCONTINUED | OUTPATIENT
Start: 2017-03-22 | End: 2017-03-22

## 2017-03-22 RX ORDER — DEXTROSE MONOHYDRATE 50 MG/ML
100 INJECTION, SOLUTION INTRAVENOUS PRN
Status: DISCONTINUED | OUTPATIENT
Start: 2017-03-22 | End: 2017-03-22

## 2017-03-22 RX ORDER — FENTANYL CITRATE 50 UG/ML
100 INJECTION, SOLUTION INTRAMUSCULAR; INTRAVENOUS
Status: DISCONTINUED | OUTPATIENT
Start: 2017-03-22 | End: 2017-03-22

## 2017-03-22 RX ORDER — INSULIN GLARGINE 100 [IU]/ML
10 INJECTION, SOLUTION SUBCUTANEOUS NIGHTLY
Status: DISCONTINUED | OUTPATIENT
Start: 2017-03-22 | End: 2017-03-23

## 2017-03-22 RX ORDER — SODIUM CHLORIDE 9 MG/ML
INJECTION, SOLUTION INTRAVENOUS CONTINUOUS
Status: DISCONTINUED | OUTPATIENT
Start: 2017-03-22 | End: 2017-04-02

## 2017-03-22 RX ORDER — FENTANYL CITRATE 50 UG/ML
50 INJECTION, SOLUTION INTRAMUSCULAR; INTRAVENOUS
Status: DISCONTINUED | OUTPATIENT
Start: 2017-03-22 | End: 2017-04-03

## 2017-03-22 RX ORDER — DEXTROSE MONOHYDRATE 25 G/50ML
12.5 INJECTION, SOLUTION INTRAVENOUS PRN
Status: DISCONTINUED | OUTPATIENT
Start: 2017-03-22 | End: 2017-04-04

## 2017-03-22 RX ADMIN — PROPOFOL 50 MCG/KG/MIN: 10 INJECTION, EMULSION INTRAVENOUS at 08:47

## 2017-03-22 RX ADMIN — SODIUM CHLORIDE 8 MG/HR: 9 INJECTION, SOLUTION INTRAVENOUS at 06:35

## 2017-03-22 RX ADMIN — Medication 10 UNITS: at 17:54

## 2017-03-22 RX ADMIN — POTASSIUM CHLORIDE 10 MEQ: 10 INJECTION, SOLUTION INTRAVENOUS at 12:16

## 2017-03-22 RX ADMIN — LEVOFLOXACIN 750 MG: 5 INJECTION, SOLUTION INTRAVENOUS at 01:23

## 2017-03-22 RX ADMIN — DEXTROSE AND SODIUM CHLORIDE 150 ML/HR: 5; 450 INJECTION, SOLUTION INTRAVENOUS at 06:33

## 2017-03-22 RX ADMIN — HEPARIN SODIUM 5000 UNITS: 5000 INJECTION, SOLUTION INTRAVENOUS; SUBCUTANEOUS at 21:28

## 2017-03-22 RX ADMIN — METOPROLOL TARTRATE 25 MG: 25 TABLET ORAL at 21:28

## 2017-03-22 RX ADMIN — SODIUM CHLORIDE 2000 ML: 9 INJECTION, SOLUTION INTRAVENOUS at 01:28

## 2017-03-22 RX ADMIN — SODIUM PHOSPHATE, MONOBASIC, MONOHYDRATE AND SODIUM PHOSPHATE, DIBASIC, ANHYDROUS 15 MMOL: 276; 142 INJECTION, SOLUTION INTRAVENOUS at 05:23

## 2017-03-22 RX ADMIN — SODIUM CHLORIDE: 9 INJECTION, SOLUTION INTRAVENOUS at 17:14

## 2017-03-22 RX ADMIN — LEVETIRACETAM 500 MG: 500 INJECTION, SOLUTION, CONCENTRATE INTRAVENOUS at 21:18

## 2017-03-22 RX ADMIN — INSULIN LISPRO 2 UNITS: 100 INJECTION, SOLUTION INTRAVENOUS; SUBCUTANEOUS at 21:29

## 2017-03-22 RX ADMIN — Medication 10 ML: at 21:29

## 2017-03-22 RX ADMIN — POTASSIUM CHLORIDE 10 MEQ: 10 INJECTION, SOLUTION INTRAVENOUS at 05:57

## 2017-03-22 RX ADMIN — DEXTROSE AND SODIUM CHLORIDE: 5; 450 INJECTION, SOLUTION INTRAVENOUS at 13:40

## 2017-03-22 RX ADMIN — FENTANYL CITRATE 100 MCG: 50 INJECTION, SOLUTION INTRAMUSCULAR; INTRAVENOUS at 17:32

## 2017-03-22 RX ADMIN — ACYCLOVIR SODIUM 725 MG: 50 INJECTION, SOLUTION INTRAVENOUS at 17:42

## 2017-03-22 RX ADMIN — POTASSIUM CHLORIDE 10 MEQ: 10 INJECTION, SOLUTION INTRAVENOUS at 17:19

## 2017-03-22 RX ADMIN — FENTANYL CITRATE 100 MCG: 50 INJECTION, SOLUTION INTRAMUSCULAR; INTRAVENOUS at 08:41

## 2017-03-22 RX ADMIN — SODIUM CHLORIDE 8 MG/HR: 9 INJECTION, SOLUTION INTRAVENOUS at 17:11

## 2017-03-22 RX ADMIN — PROPOFOL 50 MCG/KG/MIN: 10 INJECTION, EMULSION INTRAVENOUS at 23:54

## 2017-03-22 RX ADMIN — ACYCLOVIR SODIUM 725 MG: 50 INJECTION, SOLUTION INTRAVENOUS at 10:00

## 2017-03-22 RX ADMIN — PHENYTOIN SODIUM 100 MG: 50 INJECTION INTRAMUSCULAR; INTRAVENOUS at 21:08

## 2017-03-22 RX ADMIN — PROPOFOL 50 MCG/KG/MIN: 10 INJECTION, EMULSION INTRAVENOUS at 02:03

## 2017-03-22 RX ADMIN — METOPROLOL TARTRATE 25 MG: 25 TABLET ORAL at 17:20

## 2017-03-22 RX ADMIN — PHENYTOIN SODIUM 100 MG: 50 INJECTION INTRAMUSCULAR; INTRAVENOUS at 11:14

## 2017-03-22 RX ADMIN — POTASSIUM CHLORIDE 10 MEQ: 10 INJECTION, SOLUTION INTRAVENOUS at 04:52

## 2017-03-22 RX ADMIN — POTASSIUM CHLORIDE 10 MEQ: 10 INJECTION, SOLUTION INTRAVENOUS at 14:21

## 2017-03-22 RX ADMIN — FENTANYL CITRATE 100 MCG: 50 INJECTION, SOLUTION INTRAMUSCULAR; INTRAVENOUS at 05:57

## 2017-03-22 RX ADMIN — LEVETIRACETAM 500 MG: 500 INJECTION, SOLUTION, CONCENTRATE INTRAVENOUS at 08:47

## 2017-03-22 RX ADMIN — PROPOFOL 50 MCG/KG/MIN: 10 INJECTION, EMULSION INTRAVENOUS at 14:40

## 2017-03-22 RX ADMIN — HEPARIN SODIUM 5000 UNITS: 5000 INJECTION, SOLUTION INTRAVENOUS; SUBCUTANEOUS at 01:19

## 2017-03-22 RX ADMIN — PHENYTOIN SODIUM 100 MG: 50 INJECTION INTRAMUSCULAR; INTRAVENOUS at 02:58

## 2017-03-22 RX ADMIN — PROPOFOL 50 MCG/KG/MIN: 10 INJECTION, EMULSION INTRAVENOUS at 04:54

## 2017-03-22 RX ADMIN — FENTANYL CITRATE 100 MCG: 50 INJECTION, SOLUTION INTRAMUSCULAR; INTRAVENOUS at 15:20

## 2017-03-22 RX ADMIN — PHENYTOIN SODIUM 1000 MG: 50 INJECTION INTRAMUSCULAR; INTRAVENOUS at 00:04

## 2017-03-22 RX ADMIN — INSULIN LISPRO 2 UNITS: 100 INJECTION, SOLUTION INTRAVENOUS; SUBCUTANEOUS at 17:48

## 2017-03-22 RX ADMIN — POTASSIUM CHLORIDE 10 MEQ: 10 INJECTION, SOLUTION INTRAVENOUS at 03:03

## 2017-03-22 RX ADMIN — ACYCLOVIR SODIUM 725 MG: 50 INJECTION, SOLUTION INTRAVENOUS at 01:07

## 2017-03-22 RX ADMIN — POTASSIUM CHLORIDE 10 MEQ: 10 INJECTION, SOLUTION INTRAVENOUS at 04:53

## 2017-03-22 RX ADMIN — PROPOFOL 50 MCG/KG/MIN: 10 INJECTION, EMULSION INTRAVENOUS at 05:22

## 2017-03-22 RX ADMIN — CALCIUM GLUCONATE 2 G: 94 INJECTION, SOLUTION INTRAVENOUS at 06:34

## 2017-03-22 RX ADMIN — LEVOFLOXACIN 750 MG: 5 INJECTION, SOLUTION INTRAVENOUS at 21:45

## 2017-03-22 RX ADMIN — FENTANYL CITRATE 50 MCG: 50 INJECTION, SOLUTION INTRAMUSCULAR; INTRAVENOUS at 01:18

## 2017-03-22 RX ADMIN — POTASSIUM CHLORIDE 10 MEQ: 10 INJECTION, SOLUTION INTRAVENOUS at 13:16

## 2017-03-22 ASSESSMENT — PULMONARY FUNCTION TESTS
PIF_VALUE: 16
PIF_VALUE: 18
PIF_VALUE: 23
PIF_VALUE: 17
PIF_VALUE: 19
PIF_VALUE: 17

## 2017-03-23 ENCOUNTER — APPOINTMENT (OUTPATIENT)
Dept: GENERAL RADIOLOGY | Age: 72
DRG: 870 | End: 2017-03-23
Payer: MEDICARE

## 2017-03-23 LAB
ABSOLUTE EOS #: 0 K/UL (ref 0–0.4)
ABSOLUTE LYMPH #: 0.7 K/UL (ref 1–4.8)
ABSOLUTE MONO #: 0.7 K/UL (ref 0.1–1.2)
ALBUMIN SERPL-MCNC: 3.1 G/DL (ref 3.5–5.2)
ALBUMIN/GLOBULIN RATIO: 1.1 (ref 1–2.5)
ALP BLD-CCNC: 64 U/L (ref 40–129)
ALT SERPL-CCNC: 12 U/L (ref 5–41)
ANION GAP SERPL CALCULATED.3IONS-SCNC: 14 MMOL/L (ref 9–17)
ANION GAP SERPL CALCULATED.3IONS-SCNC: 15 MMOL/L (ref 9–17)
ANION GAP SERPL CALCULATED.3IONS-SCNC: 15 MMOL/L (ref 9–17)
AST SERPL-CCNC: 27 U/L
BASOPHILS # BLD: 0 % (ref 0–2)
BASOPHILS ABSOLUTE: 0 K/UL (ref 0–0.2)
BILIRUB SERPL-MCNC: 0.71 MG/DL (ref 0.3–1.2)
BILIRUBIN DIRECT: 0.13 MG/DL
BILIRUBIN, INDIRECT: 0.58 MG/DL (ref 0–1)
BUN BLDV-MCNC: 3 MG/DL (ref 8–23)
BUN BLDV-MCNC: 4 MG/DL (ref 8–23)
BUN BLDV-MCNC: 5 MG/DL (ref 8–23)
BUN/CREAT BLD: ABNORMAL (ref 9–20)
C-REACTIVE PROTEIN: 99.8 MG/L (ref 0–5)
CALCIUM IONIZED: 1.01 MMOL/L (ref 1.13–1.33)
CALCIUM SERPL-MCNC: 7.5 MG/DL (ref 8.6–10.4)
CALCIUM SERPL-MCNC: 7.5 MG/DL (ref 8.6–10.4)
CALCIUM SERPL-MCNC: 7.7 MG/DL (ref 8.6–10.4)
CHLORIDE BLD-SCNC: 104 MMOL/L (ref 98–107)
CHLORIDE BLD-SCNC: 104 MMOL/L (ref 98–107)
CHLORIDE BLD-SCNC: 99 MMOL/L (ref 98–107)
CO2: 21 MMOL/L (ref 20–31)
CREAT SERPL-MCNC: 0.5 MG/DL (ref 0.7–1.2)
CREAT SERPL-MCNC: 0.53 MG/DL (ref 0.7–1.2)
CREAT SERPL-MCNC: 0.56 MG/DL (ref 0.7–1.2)
CRYPTOCOCCAL ANTIGEN CSF: NEGATIVE
DIFFERENTIAL TYPE: ABNORMAL
EOSINOPHILS RELATIVE PERCENT: 0 % (ref 1–4)
FIO2: 100
FIO2: 40
FIO2: 80
GFR AFRICAN AMERICAN: >60 ML/MIN
GFR NON-AFRICAN AMERICAN: >60 ML/MIN
GFR SERPL CREATININE-BSD FRML MDRD: ABNORMAL ML/MIN/{1.73_M2}
GLOBULIN: ABNORMAL G/DL (ref 1.5–3.8)
GLUCOSE BLD-MCNC: 154 MG/DL (ref 75–110)
GLUCOSE BLD-MCNC: 181 MG/DL (ref 75–110)
GLUCOSE BLD-MCNC: 187 MG/DL (ref 70–99)
GLUCOSE BLD-MCNC: 189 MG/DL (ref 75–110)
GLUCOSE BLD-MCNC: 189 MG/DL (ref 75–110)
GLUCOSE BLD-MCNC: 191 MG/DL (ref 70–99)
GLUCOSE BLD-MCNC: 192 MG/DL (ref 70–99)
GLUCOSE BLD-MCNC: 243 MG/DL (ref 75–110)
GLUCOSE BLD-MCNC: 83 MG/DL (ref 75–110)
HCT VFR BLD CALC: 43.8 % (ref 41–53)
HCT VFR BLD CALC: 44.2 % (ref 41–53)
HEMOGLOBIN: 14.7 G/DL (ref 13.5–17.5)
HEMOGLOBIN: 15.1 G/DL (ref 13.5–17.5)
LYMPHOCYTES # BLD: 4 % (ref 24–44)
MAGNESIUM: 1.8 MG/DL (ref 1.6–2.6)
MAGNESIUM: 1.8 MG/DL (ref 1.6–2.6)
MAGNESIUM: 1.9 MG/DL (ref 1.6–2.6)
MCH RBC QN AUTO: 30.3 PG (ref 26–34)
MCHC RBC AUTO-ENTMCNC: 34.1 G/DL (ref 31–37)
MCV RBC AUTO: 88.8 FL (ref 80–100)
MONOCYTES # BLD: 5 % (ref 2–11)
NEGATIVE BASE EXCESS, ART: 2 (ref 0–2)
NEGATIVE BASE EXCESS, ART: 4 (ref 0–2)
NEGATIVE BASE EXCESS, ART: 5 (ref 0–2)
O2 DEVICE/FLOW/%: ABNORMAL
PATIENT TEMP: 37.8
PATIENT TEMP: 37.8
PATIENT TEMP: ABNORMAL
PDW BLD-RTO: 14.6 % (ref 12.5–15.4)
PHOSPHORUS: 1.7 MG/DL (ref 2.5–4.5)
PHOSPHORUS: 2 MG/DL (ref 2.5–4.5)
PHOSPHORUS: 2.2 MG/DL (ref 2.5–4.5)
PLATELET # BLD: 113 K/UL (ref 140–450)
PLATELET ESTIMATE: ABNORMAL
PMV BLD AUTO: 10.6 FL (ref 6–12)
POC HCO3: 20.9 MMOL/L (ref 22–27)
POC HCO3: 21 MMOL/L (ref 22–27)
POC HCO3: 23.3 MMOL/L (ref 22–27)
POC O2 SATURATION: 89 %
POC O2 SATURATION: 96 %
POC O2 SATURATION: 97 %
POC PCO2 TEMP: ABNORMAL MM HG
POC PCO2: 33 MM HG (ref 32–45)
POC PCO2: 39 MM HG (ref 32–45)
POC PCO2: 40 MM HG (ref 32–45)
POC PH TEMP: ABNORMAL
POC PH: 7.32 (ref 7.35–7.45)
POC PH: 7.39 (ref 7.35–7.45)
POC PH: 7.42 (ref 7.35–7.45)
POC PO2 TEMP: ABNORMAL MM HG
POC PO2: 57 MM HG (ref 75–95)
POC PO2: 79 MM HG (ref 75–95)
POC PO2: 98 MM HG (ref 75–95)
POSITIVE BASE EXCESS, ART: ABNORMAL (ref 0–2)
POTASSIUM SERPL-SCNC: 3.1 MMOL/L (ref 3.7–5.3)
POTASSIUM SERPL-SCNC: 3.8 MMOL/L (ref 3.7–5.3)
POTASSIUM SERPL-SCNC: 3.9 MMOL/L (ref 3.7–5.3)
RBC # BLD: 4.98 M/UL (ref 4.5–5.9)
RBC # BLD: ABNORMAL 10*6/UL
SEG NEUTROPHILS: 91 % (ref 36–66)
SEGMENTED NEUTROPHILS ABSOLUTE COUNT: 14.5 K/UL (ref 1.8–7.7)
SODIUM BLD-SCNC: 135 MMOL/L (ref 135–144)
SODIUM BLD-SCNC: 139 MMOL/L (ref 135–144)
SODIUM BLD-SCNC: 140 MMOL/L (ref 135–144)
TCO2 (CALC), ART: 22 MM HG (ref 23–28)
TCO2 (CALC), ART: 22 MM HG (ref 23–28)
TCO2 (CALC), ART: 24 MM HG (ref 23–28)
TOTAL CK: 762 U/L (ref 39–308)
TOTAL PROTEIN: 5.8 G/DL (ref 6.4–8.3)
WBC # BLD: 16 K/UL (ref 3.5–11)
WBC # BLD: ABNORMAL 10*3/UL

## 2017-03-23 PROCEDURE — 2580000003 HC RX 258: Performed by: PSYCHIATRY & NEUROLOGY

## 2017-03-23 PROCEDURE — 6360000002 HC RX W HCPCS: Performed by: PSYCHIATRY & NEUROLOGY

## 2017-03-23 PROCEDURE — 6370000000 HC RX 637 (ALT 250 FOR IP): Performed by: HOSPITALIST

## 2017-03-23 PROCEDURE — 31720 CLEARANCE OF AIRWAYS: CPT

## 2017-03-23 PROCEDURE — 82803 BLOOD GASES ANY COMBINATION: CPT

## 2017-03-23 PROCEDURE — 6360000002 HC RX W HCPCS: Performed by: INTERNAL MEDICINE

## 2017-03-23 PROCEDURE — 83735 ASSAY OF MAGNESIUM: CPT

## 2017-03-23 PROCEDURE — 96375 TX/PRO/DX INJ NEW DRUG ADDON: CPT

## 2017-03-23 PROCEDURE — 99233 SBSQ HOSP IP/OBS HIGH 50: CPT | Performed by: PSYCHIATRY & NEUROLOGY

## 2017-03-23 PROCEDURE — 71010 XR CHEST PORTABLE: CPT

## 2017-03-23 PROCEDURE — 6370000000 HC RX 637 (ALT 250 FOR IP): Performed by: INTERNAL MEDICINE

## 2017-03-23 PROCEDURE — 5A1955Z RESPIRATORY VENTILATION, GREATER THAN 96 CONSECUTIVE HOURS: ICD-10-PCS | Performed by: INTERNAL MEDICINE

## 2017-03-23 PROCEDURE — 2500000003 HC RX 250 WO HCPCS: Performed by: EMERGENCY MEDICINE

## 2017-03-23 PROCEDURE — 94003 VENT MGMT INPAT SUBQ DAY: CPT

## 2017-03-23 PROCEDURE — C9113 INJ PANTOPRAZOLE SODIUM, VIA: HCPCS | Performed by: INTERNAL MEDICINE

## 2017-03-23 PROCEDURE — 82550 ASSAY OF CK (CPK): CPT

## 2017-03-23 PROCEDURE — 36600 WITHDRAWAL OF ARTERIAL BLOOD: CPT

## 2017-03-23 PROCEDURE — 36415 COLL VENOUS BLD VENIPUNCTURE: CPT

## 2017-03-23 PROCEDURE — 2500000003 HC RX 250 WO HCPCS: Performed by: INTERNAL MEDICINE

## 2017-03-23 PROCEDURE — 6360000002 HC RX W HCPCS

## 2017-03-23 PROCEDURE — 80048 BASIC METABOLIC PNL TOTAL CA: CPT

## 2017-03-23 PROCEDURE — 96365 THER/PROPH/DIAG IV INF INIT: CPT

## 2017-03-23 PROCEDURE — 85025 COMPLETE CBC W/AUTO DIFF WBC: CPT

## 2017-03-23 PROCEDURE — 80076 HEPATIC FUNCTION PANEL: CPT

## 2017-03-23 PROCEDURE — 94762 N-INVAS EAR/PLS OXIMTRY CONT: CPT

## 2017-03-23 PROCEDURE — 84100 ASSAY OF PHOSPHORUS: CPT

## 2017-03-23 PROCEDURE — 85014 HEMATOCRIT: CPT

## 2017-03-23 PROCEDURE — 86140 C-REACTIVE PROTEIN: CPT

## 2017-03-23 PROCEDURE — 0BH18EZ INSERTION OF ENDOTRACHEAL AIRWAY INTO TRACHEA, VIA NATURAL OR ARTIFICIAL OPENING ENDOSCOPIC: ICD-10-PCS | Performed by: INTERNAL MEDICINE

## 2017-03-23 PROCEDURE — 6360000002 HC RX W HCPCS: Performed by: EMERGENCY MEDICINE

## 2017-03-23 PROCEDURE — 2580000003 HC RX 258: Performed by: HOSPITALIST

## 2017-03-23 PROCEDURE — 99291 CRITICAL CARE FIRST HOUR: CPT | Performed by: INTERNAL MEDICINE

## 2017-03-23 PROCEDURE — 2580000003 HC RX 258: Performed by: INTERNAL MEDICINE

## 2017-03-23 PROCEDURE — 85018 HEMOGLOBIN: CPT

## 2017-03-23 PROCEDURE — 82947 ASSAY GLUCOSE BLOOD QUANT: CPT

## 2017-03-23 PROCEDURE — 2000000000 HC ICU R&B

## 2017-03-23 PROCEDURE — 2580000003 HC RX 258: Performed by: EMERGENCY MEDICINE

## 2017-03-23 PROCEDURE — 93306 TTE W/DOPPLER COMPLETE: CPT

## 2017-03-23 PROCEDURE — 82330 ASSAY OF CALCIUM: CPT

## 2017-03-23 PROCEDURE — 95951 HC EEG MONITORING VIDEO RECORDING: CPT

## 2017-03-23 PROCEDURE — 94770 HC ETCO2 MONITOR DAILY: CPT

## 2017-03-23 PROCEDURE — 6360000002 HC RX W HCPCS: Performed by: HOSPITALIST

## 2017-03-23 RX ORDER — ACETAMINOPHEN 325 MG/1
650 TABLET ORAL EVERY 4 HOURS PRN
Status: DISCONTINUED | OUTPATIENT
Start: 2017-03-23 | End: 2017-04-05 | Stop reason: HOSPADM

## 2017-03-23 RX ORDER — PROPOFOL 10 MG/ML
INJECTION, EMULSION INTRAVENOUS
Status: COMPLETED
Start: 2017-03-23 | End: 2017-03-23

## 2017-03-23 RX ORDER — INSULIN GLARGINE 100 [IU]/ML
20 INJECTION, SOLUTION SUBCUTANEOUS NIGHTLY
Status: DISCONTINUED | OUTPATIENT
Start: 2017-03-23 | End: 2017-03-25

## 2017-03-23 RX ORDER — LORAZEPAM 2 MG/ML
1 INJECTION INTRAMUSCULAR
Status: DISCONTINUED | OUTPATIENT
Start: 2017-03-23 | End: 2017-04-02

## 2017-03-23 RX ORDER — POTASSIUM CHLORIDE 29.8 MG/ML
20 INJECTION INTRAVENOUS PRN
Status: DISCONTINUED | OUTPATIENT
Start: 2017-03-23 | End: 2017-03-30

## 2017-03-23 RX ORDER — PROPOFOL 10 MG/ML
10 INJECTION, EMULSION INTRAVENOUS
Status: DISCONTINUED | OUTPATIENT
Start: 2017-03-23 | End: 2017-04-02

## 2017-03-23 RX ORDER — LORAZEPAM 2 MG/ML
2 INJECTION INTRAMUSCULAR
Status: DISCONTINUED | OUTPATIENT
Start: 2017-03-23 | End: 2017-04-02

## 2017-03-23 RX ORDER — LORAZEPAM 2 MG/ML
2 INJECTION INTRAMUSCULAR EVERY 4 HOURS PRN
Status: DISCONTINUED | OUTPATIENT
Start: 2017-03-23 | End: 2017-03-23

## 2017-03-23 RX ORDER — LORAZEPAM 2 MG/ML
1 INJECTION INTRAMUSCULAR EVERY 4 HOURS PRN
Status: DISCONTINUED | OUTPATIENT
Start: 2017-03-23 | End: 2017-03-23

## 2017-03-23 RX ADMIN — PHENYTOIN SODIUM 100 MG: 50 INJECTION INTRAMUSCULAR; INTRAVENOUS at 11:20

## 2017-03-23 RX ADMIN — ACYCLOVIR SODIUM 725 MG: 50 INJECTION, SOLUTION INTRAVENOUS at 09:17

## 2017-03-23 RX ADMIN — ACETAMINOPHEN 650 MG: 325 TABLET ORAL at 09:32

## 2017-03-23 RX ADMIN — PROPOFOL 50 MCG/KG/MIN: 10 INJECTION, EMULSION INTRAVENOUS at 04:56

## 2017-03-23 RX ADMIN — FENTANYL CITRATE 100 MCG: 50 INJECTION, SOLUTION INTRAMUSCULAR; INTRAVENOUS at 20:13

## 2017-03-23 RX ADMIN — INSULIN GLARGINE 20 UNITS: 100 INJECTION, SOLUTION SUBCUTANEOUS at 21:28

## 2017-03-23 RX ADMIN — POTASSIUM CHLORIDE 20 MEQ: 29.8 INJECTION, SOLUTION INTRAVENOUS at 01:57

## 2017-03-23 RX ADMIN — LORAZEPAM 1 MG: 2 INJECTION INTRAMUSCULAR; INTRAVENOUS at 12:16

## 2017-03-23 RX ADMIN — Medication 1 MG/HR: at 11:51

## 2017-03-23 RX ADMIN — INSULIN LISPRO 2 UNITS: 100 INJECTION, SOLUTION INTRAVENOUS; SUBCUTANEOUS at 09:36

## 2017-03-23 RX ADMIN — INSULIN LISPRO 4 UNITS: 100 INJECTION, SOLUTION INTRAVENOUS; SUBCUTANEOUS at 13:22

## 2017-03-23 RX ADMIN — PHENYTOIN SODIUM 100 MG: 50 INJECTION INTRAMUSCULAR; INTRAVENOUS at 21:19

## 2017-03-23 RX ADMIN — HEPARIN SODIUM 5000 UNITS: 5000 INJECTION, SOLUTION INTRAVENOUS; SUBCUTANEOUS at 21:19

## 2017-03-23 RX ADMIN — LEVETIRACETAM 500 MG: 500 INJECTION, SOLUTION, CONCENTRATE INTRAVENOUS at 18:25

## 2017-03-23 RX ADMIN — LEVETIRACETAM 500 MG: 500 INJECTION, SOLUTION, CONCENTRATE INTRAVENOUS at 06:03

## 2017-03-23 RX ADMIN — METOPROLOL TARTRATE 5 MG: 5 INJECTION INTRAVENOUS at 18:25

## 2017-03-23 RX ADMIN — PROPOFOL 25 MCG/KG/MIN: 10 INJECTION, EMULSION INTRAVENOUS at 21:18

## 2017-03-23 RX ADMIN — FENTANYL CITRATE 100 MCG: 50 INJECTION, SOLUTION INTRAMUSCULAR; INTRAVENOUS at 09:42

## 2017-03-23 RX ADMIN — HEPARIN SODIUM 5000 UNITS: 5000 INJECTION, SOLUTION INTRAVENOUS; SUBCUTANEOUS at 06:03

## 2017-03-23 RX ADMIN — LORAZEPAM 2 MG: 2 INJECTION INTRAMUSCULAR; INTRAVENOUS at 13:06

## 2017-03-23 RX ADMIN — INSULIN LISPRO 2 UNITS: 100 INJECTION, SOLUTION INTRAVENOUS; SUBCUTANEOUS at 04:56

## 2017-03-23 RX ADMIN — SODIUM PHOSPHATE, MONOBASIC, MONOHYDRATE AND SODIUM PHOSPHATE, DIBASIC, ANHYDROUS 15 MMOL: 276; 142 INJECTION, SOLUTION INTRAVENOUS at 05:59

## 2017-03-23 RX ADMIN — METOPROLOL TARTRATE 25 MG: 25 TABLET ORAL at 09:31

## 2017-03-23 RX ADMIN — SODIUM CHLORIDE 8 MG/HR: 9 INJECTION, SOLUTION INTRAVENOUS at 11:20

## 2017-03-23 RX ADMIN — HEPARIN SODIUM 5000 UNITS: 5000 INJECTION, SOLUTION INTRAVENOUS; SUBCUTANEOUS at 14:18

## 2017-03-23 RX ADMIN — PROPOFOL 50 MCG/KG/MIN: 10 INJECTION, EMULSION INTRAVENOUS at 09:17

## 2017-03-23 RX ADMIN — ONDANSETRON 4 MG: 2 INJECTION, SOLUTION INTRAMUSCULAR; INTRAVENOUS at 12:00

## 2017-03-23 RX ADMIN — LEVOFLOXACIN 750 MG: 5 INJECTION, SOLUTION INTRAVENOUS at 18:25

## 2017-03-23 RX ADMIN — INSULIN LISPRO 2 UNITS: 100 INJECTION, SOLUTION INTRAVENOUS; SUBCUTANEOUS at 21:28

## 2017-03-23 RX ADMIN — ONDANSETRON 4 MG: 2 INJECTION, SOLUTION INTRAMUSCULAR; INTRAVENOUS at 18:00

## 2017-03-23 RX ADMIN — LORAZEPAM 2 MG: 2 INJECTION INTRAMUSCULAR; INTRAVENOUS at 18:25

## 2017-03-23 RX ADMIN — CEFTRIAXONE SODIUM 1 G: 1 INJECTION, POWDER, FOR SOLUTION INTRAMUSCULAR; INTRAVENOUS at 17:19

## 2017-03-23 RX ADMIN — INSULIN LISPRO 2 UNITS: 100 INJECTION, SOLUTION INTRAVENOUS; SUBCUTANEOUS at 01:31

## 2017-03-23 RX ADMIN — Medication 10 ML: at 21:19

## 2017-03-23 RX ADMIN — PHENYTOIN SODIUM 100 MG: 50 INJECTION INTRAMUSCULAR; INTRAVENOUS at 04:19

## 2017-03-23 RX ADMIN — Medication 10 ML: at 09:31

## 2017-03-23 RX ADMIN — FENTANYL CITRATE 100 MCG: 50 INJECTION, SOLUTION INTRAMUSCULAR; INTRAVENOUS at 18:00

## 2017-03-23 RX ADMIN — SODIUM CHLORIDE: 9 INJECTION, SOLUTION INTRAVENOUS at 01:58

## 2017-03-23 RX ADMIN — FENTANYL CITRATE 100 MCG: 50 INJECTION, SOLUTION INTRAMUSCULAR; INTRAVENOUS at 12:00

## 2017-03-23 RX ADMIN — SODIUM CHLORIDE: 9 INJECTION, SOLUTION INTRAVENOUS at 13:23

## 2017-03-23 RX ADMIN — FENTANYL CITRATE 100 MCG: 50 INJECTION, SOLUTION INTRAMUSCULAR; INTRAVENOUS at 13:51

## 2017-03-23 RX ADMIN — ACYCLOVIR SODIUM 725 MG: 50 INJECTION, SOLUTION INTRAVENOUS at 01:30

## 2017-03-23 RX ADMIN — CALCIUM GLUCONATE 1 G: 94 INJECTION, SOLUTION INTRAVENOUS at 17:21

## 2017-03-23 ASSESSMENT — PULMONARY FUNCTION TESTS
PIF_VALUE: 24
PIF_VALUE: 18
PIF_VALUE: 13
PIF_VALUE: 19
PIF_VALUE: 15
PIF_VALUE: 13
PIF_VALUE: 18
PIF_VALUE: 17

## 2017-03-24 ENCOUNTER — APPOINTMENT (OUTPATIENT)
Dept: MRI IMAGING | Age: 72
DRG: 870 | End: 2017-03-24
Payer: MEDICARE

## 2017-03-24 LAB
ABSOLUTE EOS #: 0 K/UL (ref 0–0.4)
ABSOLUTE LYMPH #: 1.2 K/UL (ref 1–4.8)
ABSOLUTE MONO #: 1.1 K/UL (ref 0.1–1.2)
ANION GAP SERPL CALCULATED.3IONS-SCNC: 17 MMOL/L (ref 9–17)
BASOPHILS # BLD: 0 % (ref 0–2)
BASOPHILS ABSOLUTE: 0 K/UL (ref 0–0.2)
BUN BLDV-MCNC: 8 MG/DL (ref 8–23)
BUN/CREAT BLD: ABNORMAL (ref 9–20)
C-REACTIVE PROTEIN: 181.7 MG/L (ref 0–5)
CALCIUM SERPL-MCNC: 8.1 MG/DL (ref 8.6–10.4)
CHLORIDE BLD-SCNC: 106 MMOL/L (ref 98–107)
CO2: 20 MMOL/L (ref 20–31)
CREAT SERPL-MCNC: 0.65 MG/DL (ref 0.7–1.2)
CSF ISOELECTRIC FOCUSING INTERPRETATION: NORMAL
CULTURE: ABNORMAL
CULTURE: ABNORMAL
DIFFERENTIAL TYPE: ABNORMAL
EKG ATRIAL RATE: 156 BPM
EKG P AXIS: 83 DEGREES
EKG P-R INTERVAL: 156 MS
EKG Q-T INTERVAL: 336 MS
EKG QRS DURATION: 96 MS
EKG QTC CALCULATION (BAZETT): 456 MS
EKG R AXIS: 31 DEGREES
EKG T AXIS: 117 DEGREES
EKG VENTRICULAR RATE: 111 BPM
EOSINOPHILS RELATIVE PERCENT: 0 % (ref 1–4)
FIO2: 80
GFR AFRICAN AMERICAN: >60 ML/MIN
GFR NON-AFRICAN AMERICAN: >60 ML/MIN
GFR SERPL CREATININE-BSD FRML MDRD: ABNORMAL ML/MIN/{1.73_M2}
GFR SERPL CREATININE-BSD FRML MDRD: ABNORMAL ML/MIN/{1.73_M2}
GLUCOSE BLD-MCNC: 107 MG/DL (ref 75–110)
GLUCOSE BLD-MCNC: 136 MG/DL (ref 75–110)
GLUCOSE BLD-MCNC: 139 MG/DL (ref 70–99)
GLUCOSE BLD-MCNC: 164 MG/DL (ref 75–110)
GLUCOSE BLD-MCNC: 82 MG/DL (ref 75–110)
GLUCOSE BLD-MCNC: 99 MG/DL (ref 75–110)
HCT VFR BLD CALC: 42.8 % (ref 41–53)
HEMOGLOBIN: 14.1 G/DL (ref 13.5–17.5)
LYMPHOCYTES # BLD: 8 % (ref 24–44)
Lab: ABNORMAL
MAGNESIUM: 1.9 MG/DL (ref 1.6–2.6)
MCH RBC QN AUTO: 30.7 PG (ref 26–34)
MCHC RBC AUTO-ENTMCNC: 33.1 G/DL (ref 31–37)
MCV RBC AUTO: 92.8 FL (ref 80–100)
MONOCYTES # BLD: 8 % (ref 2–11)
MYELIN BASIC PROTEIN, CSF: 1.03 NG/ML (ref 0–5.5)
NEGATIVE BASE EXCESS, ART: 1 (ref 0–2)
O2 DEVICE/FLOW/%: ABNORMAL
OLIGOCLONAL BANDS NUMBER: 0 BANDS (ref 0–1)
OLIGOCLONAL BANDS: NEGATIVE
ORGANISM: ABNORMAL
PATIENT TEMP: 37.5
PDW BLD-RTO: 15.1 % (ref 12.5–15.4)
PHENYTOIN DATE LAST DOSE: ABNORMAL
PHENYTOIN DOSE AMOUNT: ABNORMAL
PHENYTOIN DOSE TIME: ABNORMAL
PHENYTOIN FREE: 2.1 UG/ML (ref 1–2)
PHENYTOIN LEVEL: 13.6 UG/ML (ref 10–20)
PHOSPHORUS: 2.5 MG/DL (ref 2.5–4.5)
PLATELET # BLD: 169 K/UL (ref 140–450)
PLATELET ESTIMATE: ABNORMAL
PMV BLD AUTO: 10.4 FL (ref 6–12)
POC HCO3: 24.1 MMOL/L (ref 22–27)
POC O2 SATURATION: 100 %
POC PCO2 TEMP: ABNORMAL MM HG
POC PCO2: 39 MM HG (ref 32–45)
POC PH TEMP: ABNORMAL
POC PH: 7.4 (ref 7.35–7.45)
POC PO2 TEMP: ABNORMAL MM HG
POC PO2: 309 MM HG (ref 75–95)
POSITIVE BASE EXCESS, ART: ABNORMAL (ref 0–2)
POTASSIUM SERPL-SCNC: 4.3 MMOL/L (ref 3.7–5.3)
RBC # BLD: 4.61 M/UL (ref 4.5–5.9)
RBC # BLD: ABNORMAL 10*6/UL
SEG NEUTROPHILS: 84 % (ref 36–66)
SEGMENTED NEUTROPHILS ABSOLUTE COUNT: 12.2 K/UL (ref 1.8–7.7)
SODIUM BLD-SCNC: 143 MMOL/L (ref 135–144)
SPECIMEN DESCRIPTION: ABNORMAL
STATUS: ABNORMAL
TCO2 (CALC), ART: 25 MM HG (ref 23–28)
TOTAL CK: 2847 U/L (ref 39–308)
TRIGL SERPL-MCNC: 129 MG/DL
TROPONIN INTERP: NORMAL
TROPONIN T: <0.03 NG/ML
VDRL CSF SCREEN: NONREACTIVE
WBC # BLD: 14.5 K/UL (ref 3.5–11)
WBC # BLD: ABNORMAL 10*3/UL

## 2017-03-24 PROCEDURE — 84478 ASSAY OF TRIGLYCERIDES: CPT

## 2017-03-24 PROCEDURE — 94681 O2 UPTK CO2 OUTP % O2 XTRC: CPT

## 2017-03-24 PROCEDURE — 2580000003 HC RX 258: Performed by: INTERNAL MEDICINE

## 2017-03-24 PROCEDURE — 36600 WITHDRAWAL OF ARTERIAL BLOOD: CPT

## 2017-03-24 PROCEDURE — 80185 ASSAY OF PHENYTOIN TOTAL: CPT

## 2017-03-24 PROCEDURE — 2500000003 HC RX 250 WO HCPCS: Performed by: EMERGENCY MEDICINE

## 2017-03-24 PROCEDURE — 94003 VENT MGMT INPAT SUBQ DAY: CPT

## 2017-03-24 PROCEDURE — 85025 COMPLETE CBC W/AUTO DIFF WBC: CPT

## 2017-03-24 PROCEDURE — 82947 ASSAY GLUCOSE BLOOD QUANT: CPT

## 2017-03-24 PROCEDURE — 6360000002 HC RX W HCPCS: Performed by: INTERNAL MEDICINE

## 2017-03-24 PROCEDURE — 2580000003 HC RX 258: Performed by: HOSPITALIST

## 2017-03-24 PROCEDURE — 6360000002 HC RX W HCPCS: Performed by: PSYCHIATRY & NEUROLOGY

## 2017-03-24 PROCEDURE — 94762 N-INVAS EAR/PLS OXIMTRY CONT: CPT

## 2017-03-24 PROCEDURE — 87186 SC STD MICRODIL/AGAR DIL: CPT

## 2017-03-24 PROCEDURE — 6360000002 HC RX W HCPCS: Performed by: EMERGENCY MEDICINE

## 2017-03-24 PROCEDURE — C9113 INJ PANTOPRAZOLE SODIUM, VIA: HCPCS | Performed by: INTERNAL MEDICINE

## 2017-03-24 PROCEDURE — 82550 ASSAY OF CK (CPK): CPT

## 2017-03-24 PROCEDURE — 82803 BLOOD GASES ANY COMBINATION: CPT

## 2017-03-24 PROCEDURE — 6360000002 HC RX W HCPCS: Performed by: HOSPITALIST

## 2017-03-24 PROCEDURE — 84484 ASSAY OF TROPONIN QUANT: CPT

## 2017-03-24 PROCEDURE — 80048 BASIC METABOLIC PNL TOTAL CA: CPT

## 2017-03-24 PROCEDURE — 2000000000 HC ICU R&B

## 2017-03-24 PROCEDURE — 2580000003 HC RX 258: Performed by: PSYCHIATRY & NEUROLOGY

## 2017-03-24 PROCEDURE — 6370000000 HC RX 637 (ALT 250 FOR IP): Performed by: INTERNAL MEDICINE

## 2017-03-24 PROCEDURE — 99291 CRITICAL CARE FIRST HOUR: CPT | Performed by: INTERNAL MEDICINE

## 2017-03-24 PROCEDURE — 84100 ASSAY OF PHOSPHORUS: CPT

## 2017-03-24 PROCEDURE — 99221 1ST HOSP IP/OBS SF/LOW 40: CPT | Performed by: NEUROLOGICAL SURGERY

## 2017-03-24 PROCEDURE — 2580000003 HC RX 258: Performed by: EMERGENCY MEDICINE

## 2017-03-24 PROCEDURE — 99233 SBSQ HOSP IP/OBS HIGH 50: CPT | Performed by: PSYCHIATRY & NEUROLOGY

## 2017-03-24 PROCEDURE — 87070 CULTURE OTHR SPECIMN AEROBIC: CPT

## 2017-03-24 PROCEDURE — 36415 COLL VENOUS BLD VENIPUNCTURE: CPT

## 2017-03-24 PROCEDURE — 86403 PARTICLE AGGLUT ANTBDY SCRN: CPT

## 2017-03-24 PROCEDURE — 87205 SMEAR GRAM STAIN: CPT

## 2017-03-24 PROCEDURE — 80186 ASSAY OF PHENYTOIN FREE: CPT

## 2017-03-24 PROCEDURE — 72141 MRI NECK SPINE W/O DYE: CPT

## 2017-03-24 PROCEDURE — 89220 SPUTUM SPECIMEN COLLECTION: CPT

## 2017-03-24 PROCEDURE — 83735 ASSAY OF MAGNESIUM: CPT

## 2017-03-24 PROCEDURE — 86140 C-REACTIVE PROTEIN: CPT

## 2017-03-24 RX ORDER — 0.9 % SODIUM CHLORIDE 0.9 %
1000 INTRAVENOUS SOLUTION INTRAVENOUS ONCE
Status: COMPLETED | OUTPATIENT
Start: 2017-03-24 | End: 2017-03-24

## 2017-03-24 RX ORDER — LORAZEPAM 1 MG/1
1 TABLET ORAL EVERY 4 HOURS PRN
Status: DISCONTINUED | OUTPATIENT
Start: 2017-03-24 | End: 2017-04-02

## 2017-03-24 RX ORDER — PANTOPRAZOLE SODIUM 40 MG/10ML
40 INJECTION, POWDER, LYOPHILIZED, FOR SOLUTION INTRAVENOUS DAILY
Status: DISCONTINUED | OUTPATIENT
Start: 2017-03-24 | End: 2017-03-28

## 2017-03-24 RX ORDER — 0.9 % SODIUM CHLORIDE 0.9 %
10 VIAL (ML) INJECTION DAILY
Status: DISCONTINUED | OUTPATIENT
Start: 2017-03-24 | End: 2017-03-28

## 2017-03-24 RX ADMIN — PHENYTOIN SODIUM 100 MG: 50 INJECTION INTRAMUSCULAR; INTRAVENOUS at 04:45

## 2017-03-24 RX ADMIN — FENTANYL CITRATE 100 MCG: 50 INJECTION, SOLUTION INTRAMUSCULAR; INTRAVENOUS at 18:14

## 2017-03-24 RX ADMIN — LEVETIRACETAM 500 MG: 500 INJECTION, SOLUTION, CONCENTRATE INTRAVENOUS at 19:02

## 2017-03-24 RX ADMIN — Medication 10 ML: at 08:59

## 2017-03-24 RX ADMIN — Medication 10 ML: at 08:58

## 2017-03-24 RX ADMIN — HEPARIN SODIUM 5000 UNITS: 5000 INJECTION, SOLUTION INTRAVENOUS; SUBCUTANEOUS at 22:20

## 2017-03-24 RX ADMIN — HEPARIN SODIUM 5000 UNITS: 5000 INJECTION, SOLUTION INTRAVENOUS; SUBCUTANEOUS at 13:55

## 2017-03-24 RX ADMIN — FENTANYL CITRATE 100 MCG: 50 INJECTION, SOLUTION INTRAMUSCULAR; INTRAVENOUS at 08:54

## 2017-03-24 RX ADMIN — FENTANYL CITRATE 100 MCG: 50 INJECTION, SOLUTION INTRAMUSCULAR; INTRAVENOUS at 12:48

## 2017-03-24 RX ADMIN — HEPARIN SODIUM 5000 UNITS: 5000 INJECTION, SOLUTION INTRAVENOUS; SUBCUTANEOUS at 06:23

## 2017-03-24 RX ADMIN — FENTANYL CITRATE 100 MCG: 50 INJECTION, SOLUTION INTRAMUSCULAR; INTRAVENOUS at 15:59

## 2017-03-24 RX ADMIN — METOPROLOL TARTRATE 25 MG: 25 TABLET ORAL at 21:07

## 2017-03-24 RX ADMIN — PHENYTOIN SODIUM 100 MG: 50 INJECTION INTRAMUSCULAR; INTRAVENOUS at 18:05

## 2017-03-24 RX ADMIN — FENTANYL CITRATE 100 MCG: 50 INJECTION, SOLUTION INTRAMUSCULAR; INTRAVENOUS at 14:01

## 2017-03-24 RX ADMIN — PHENYTOIN SODIUM 100 MG: 50 INJECTION INTRAMUSCULAR; INTRAVENOUS at 10:16

## 2017-03-24 RX ADMIN — SODIUM CHLORIDE: 9 INJECTION, SOLUTION INTRAVENOUS at 10:16

## 2017-03-24 RX ADMIN — LEVOFLOXACIN 750 MG: 5 INJECTION, SOLUTION INTRAVENOUS at 18:16

## 2017-03-24 RX ADMIN — LEVETIRACETAM 500 MG: 500 INJECTION, SOLUTION, CONCENTRATE INTRAVENOUS at 08:54

## 2017-03-24 RX ADMIN — SODIUM CHLORIDE 1000 ML: 9 INJECTION, SOLUTION INTRAVENOUS at 16:03

## 2017-03-24 RX ADMIN — SODIUM CHLORIDE: 9 INJECTION, SOLUTION INTRAVENOUS at 16:18

## 2017-03-24 RX ADMIN — METOPROLOL TARTRATE 5 MG: 5 INJECTION INTRAVENOUS at 08:56

## 2017-03-24 RX ADMIN — PROPOFOL 20 MCG/KG/MIN: 10 INJECTION, EMULSION INTRAVENOUS at 18:04

## 2017-03-24 RX ADMIN — LORAZEPAM 1 MG: 1 TABLET ORAL at 19:02

## 2017-03-24 RX ADMIN — CEFTRIAXONE SODIUM 1 G: 1 INJECTION, POWDER, FOR SOLUTION INTRAMUSCULAR; INTRAVENOUS at 16:00

## 2017-03-24 RX ADMIN — Medication 10 ML: at 10:17

## 2017-03-24 RX ADMIN — PANTOPRAZOLE SODIUM 40 MG: 40 INJECTION, POWDER, FOR SOLUTION INTRAVENOUS at 08:58

## 2017-03-24 RX ADMIN — PROPOFOL 35 MCG/KG/MIN: 10 INJECTION, EMULSION INTRAVENOUS at 10:34

## 2017-03-24 RX ADMIN — SODIUM CHLORIDE: 9 INJECTION, SOLUTION INTRAVENOUS at 16:17

## 2017-03-24 ASSESSMENT — PULMONARY FUNCTION TESTS
PIF_VALUE: 17
PIF_VALUE: 22
PIF_VALUE: 13
PIF_VALUE: 18
PIF_VALUE: 13
PIF_VALUE: 17
PIF_VALUE: 16
PIF_VALUE: 13
PIF_VALUE: 16
PIF_VALUE: 11

## 2017-03-25 LAB
ABSOLUTE EOS #: 0.1 K/UL (ref 0–0.4)
ABSOLUTE LYMPH #: 1 K/UL (ref 1–4.8)
ABSOLUTE MONO #: 0.6 K/UL (ref 0.1–1.2)
ANION GAP SERPL CALCULATED.3IONS-SCNC: 12 MMOL/L (ref 9–17)
BASOPHILS # BLD: 1 % (ref 0–2)
BASOPHILS ABSOLUTE: 0 K/UL (ref 0–0.2)
BUN BLDV-MCNC: 8 MG/DL (ref 8–23)
BUN/CREAT BLD: ABNORMAL (ref 9–20)
C-REACTIVE PROTEIN: 106.7 MG/L (ref 0–5)
CALCIUM SERPL-MCNC: 8 MG/DL (ref 8.6–10.4)
CHLORIDE BLD-SCNC: 107 MMOL/L (ref 98–107)
CO2: 22 MMOL/L (ref 20–31)
CREAT SERPL-MCNC: 0.54 MG/DL (ref 0.7–1.2)
CULTURE: NORMAL
CULTURE: NORMAL
DIFFERENTIAL TYPE: ABNORMAL
DIRECT EXAM: NORMAL
EOSINOPHILS RELATIVE PERCENT: 1 % (ref 1–4)
FIO2: 30
GFR AFRICAN AMERICAN: >60 ML/MIN
GFR NON-AFRICAN AMERICAN: >60 ML/MIN
GFR SERPL CREATININE-BSD FRML MDRD: ABNORMAL ML/MIN/{1.73_M2}
GFR SERPL CREATININE-BSD FRML MDRD: ABNORMAL ML/MIN/{1.73_M2}
GLUCOSE BLD-MCNC: 131 MG/DL (ref 75–110)
GLUCOSE BLD-MCNC: 134 MG/DL (ref 75–110)
GLUCOSE BLD-MCNC: 154 MG/DL (ref 70–99)
GLUCOSE BLD-MCNC: 166 MG/DL (ref 75–110)
GLUCOSE BLD-MCNC: 186 MG/DL (ref 75–110)
GLUCOSE BLD-MCNC: 191 MG/DL (ref 75–110)
GLUCOSE BLD-MCNC: 193 MG/DL (ref 75–110)
GLUCOSE BLD-MCNC: 36 MG/DL (ref 75–110)
GLUCOSE BLD-MCNC: 42 MG/DL (ref 75–110)
HCT VFR BLD CALC: 36 % (ref 41–53)
HEMOGLOBIN: 12.2 G/DL (ref 13.5–17.5)
LYMPHOCYTES # BLD: 12 % (ref 24–44)
Lab: NORMAL
MAGNESIUM: 1.7 MG/DL (ref 1.6–2.6)
MCH RBC QN AUTO: 30 PG (ref 26–34)
MCHC RBC AUTO-ENTMCNC: 33.8 G/DL (ref 31–37)
MCV RBC AUTO: 88.8 FL (ref 80–100)
MONOCYTES # BLD: 6 % (ref 2–11)
NEGATIVE BASE EXCESS, ART: 6 (ref 0–2)
O2 DEVICE/FLOW/%: ABNORMAL
PATIENT TEMP: ABNORMAL
PDW BLD-RTO: 14.8 % (ref 12.5–15.4)
PHOSPHORUS: 1.5 MG/DL (ref 2.5–4.5)
PLATELET # BLD: 134 K/UL (ref 140–450)
PLATELET ESTIMATE: ABNORMAL
PMV BLD AUTO: 10.3 FL (ref 6–12)
POC HCO3: 20.2 MMOL/L (ref 22–27)
POC O2 SATURATION: 91 %
POC PCO2 TEMP: ABNORMAL MM HG
POC PCO2: 37 MM HG (ref 32–45)
POC PH TEMP: ABNORMAL
POC PH: 7.34 (ref 7.35–7.45)
POC PO2 TEMP: ABNORMAL MM HG
POC PO2: 65 MM HG (ref 75–95)
POSITIVE BASE EXCESS, ART: ABNORMAL (ref 0–2)
POTASSIUM SERPL-SCNC: 3 MMOL/L (ref 3.7–5.3)
RBC # BLD: 4.05 M/UL (ref 4.5–5.9)
RBC # BLD: ABNORMAL 10*6/UL
SEG NEUTROPHILS: 80 % (ref 36–66)
SEGMENTED NEUTROPHILS ABSOLUTE COUNT: 7.4 K/UL (ref 1.8–7.7)
SODIUM BLD-SCNC: 141 MMOL/L (ref 135–144)
SPECIMEN DESCRIPTION: NORMAL
STATUS: NORMAL
TCO2 (CALC), ART: 21 MM HG (ref 23–28)
TOTAL CK: 2647 U/L (ref 39–308)
WBC # BLD: 9.1 K/UL (ref 3.5–11)
WBC # BLD: ABNORMAL 10*3/UL

## 2017-03-25 PROCEDURE — 99291 CRITICAL CARE FIRST HOUR: CPT | Performed by: INTERNAL MEDICINE

## 2017-03-25 PROCEDURE — 84100 ASSAY OF PHOSPHORUS: CPT

## 2017-03-25 PROCEDURE — 83735 ASSAY OF MAGNESIUM: CPT

## 2017-03-25 PROCEDURE — 6360000002 HC RX W HCPCS: Performed by: HOSPITALIST

## 2017-03-25 PROCEDURE — 80048 BASIC METABOLIC PNL TOTAL CA: CPT

## 2017-03-25 PROCEDURE — 2580000003 HC RX 258: Performed by: HOSPITALIST

## 2017-03-25 PROCEDURE — 82550 ASSAY OF CK (CPK): CPT

## 2017-03-25 PROCEDURE — 94762 N-INVAS EAR/PLS OXIMTRY CONT: CPT

## 2017-03-25 PROCEDURE — 6370000000 HC RX 637 (ALT 250 FOR IP): Performed by: HOSPITALIST

## 2017-03-25 PROCEDURE — 36600 WITHDRAWAL OF ARTERIAL BLOOD: CPT

## 2017-03-25 PROCEDURE — 85025 COMPLETE CBC W/AUTO DIFF WBC: CPT

## 2017-03-25 PROCEDURE — 6360000002 HC RX W HCPCS: Performed by: INTERNAL MEDICINE

## 2017-03-25 PROCEDURE — 2580000003 HC RX 258: Performed by: EMERGENCY MEDICINE

## 2017-03-25 PROCEDURE — 6360000002 HC RX W HCPCS: Performed by: EMERGENCY MEDICINE

## 2017-03-25 PROCEDURE — 94770 HC ETCO2 MONITOR DAILY: CPT

## 2017-03-25 PROCEDURE — 86140 C-REACTIVE PROTEIN: CPT

## 2017-03-25 PROCEDURE — 2500000003 HC RX 250 WO HCPCS: Performed by: INTERNAL MEDICINE

## 2017-03-25 PROCEDURE — 94003 VENT MGMT INPAT SUBQ DAY: CPT

## 2017-03-25 PROCEDURE — 6370000000 HC RX 637 (ALT 250 FOR IP): Performed by: INTERNAL MEDICINE

## 2017-03-25 PROCEDURE — 2580000003 HC RX 258: Performed by: PSYCHIATRY & NEUROLOGY

## 2017-03-25 PROCEDURE — 82803 BLOOD GASES ANY COMBINATION: CPT

## 2017-03-25 PROCEDURE — 36415 COLL VENOUS BLD VENIPUNCTURE: CPT

## 2017-03-25 PROCEDURE — 6360000002 HC RX W HCPCS: Performed by: PSYCHIATRY & NEUROLOGY

## 2017-03-25 PROCEDURE — C9113 INJ PANTOPRAZOLE SODIUM, VIA: HCPCS | Performed by: INTERNAL MEDICINE

## 2017-03-25 PROCEDURE — 82947 ASSAY GLUCOSE BLOOD QUANT: CPT

## 2017-03-25 PROCEDURE — 2000000000 HC ICU R&B

## 2017-03-25 PROCEDURE — 2580000003 HC RX 258: Performed by: INTERNAL MEDICINE

## 2017-03-25 RX ADMIN — LORAZEPAM 1 MG: 1 TABLET ORAL at 12:41

## 2017-03-25 RX ADMIN — FENTANYL CITRATE 100 MCG: 50 INJECTION, SOLUTION INTRAMUSCULAR; INTRAVENOUS at 13:05

## 2017-03-25 RX ADMIN — HEPARIN SODIUM 5000 UNITS: 5000 INJECTION, SOLUTION INTRAVENOUS; SUBCUTANEOUS at 22:02

## 2017-03-25 RX ADMIN — DEXTROSE MONOHYDRATE 12.5 G: 25 INJECTION, SOLUTION INTRAVENOUS at 01:20

## 2017-03-25 RX ADMIN — METOPROLOL TARTRATE 25 MG: 25 TABLET ORAL at 20:26

## 2017-03-25 RX ADMIN — LORAZEPAM 2 MG: 2 INJECTION INTRAMUSCULAR; INTRAVENOUS at 18:26

## 2017-03-25 RX ADMIN — HEPARIN SODIUM 5000 UNITS: 5000 INJECTION, SOLUTION INTRAVENOUS; SUBCUTANEOUS at 13:14

## 2017-03-25 RX ADMIN — PROPOFOL 40 MCG/KG/MIN: 10 INJECTION, EMULSION INTRAVENOUS at 07:52

## 2017-03-25 RX ADMIN — LEVETIRACETAM 500 MG: 500 INJECTION, SOLUTION, CONCENTRATE INTRAVENOUS at 06:40

## 2017-03-25 RX ADMIN — DEXTROSE MONOHYDRATE 12.5 G: 25 INJECTION, SOLUTION INTRAVENOUS at 01:06

## 2017-03-25 RX ADMIN — LEVETIRACETAM 500 MG: 500 INJECTION, SOLUTION, CONCENTRATE INTRAVENOUS at 18:17

## 2017-03-25 RX ADMIN — PANTOPRAZOLE SODIUM 40 MG: 40 INJECTION, POWDER, FOR SOLUTION INTRAVENOUS at 08:13

## 2017-03-25 RX ADMIN — SODIUM CHLORIDE: 9 INJECTION, SOLUTION INTRAVENOUS at 22:09

## 2017-03-25 RX ADMIN — FENTANYL CITRATE 100 MCG: 50 INJECTION, SOLUTION INTRAMUSCULAR; INTRAVENOUS at 01:37

## 2017-03-25 RX ADMIN — LORAZEPAM 1 MG: 1 TABLET ORAL at 21:01

## 2017-03-25 RX ADMIN — SODIUM PHOSPHATE, MONOBASIC, MONOHYDRATE AND SODIUM PHOSPHATE, DIBASIC, ANHYDROUS 15 MMOL: 276; 142 INJECTION, SOLUTION INTRAVENOUS at 10:44

## 2017-03-25 RX ADMIN — LORAZEPAM 2 MG: 2 INJECTION INTRAMUSCULAR; INTRAVENOUS at 08:18

## 2017-03-25 RX ADMIN — POTASSIUM CHLORIDE 20 MEQ: 29.8 INJECTION, SOLUTION INTRAVENOUS at 06:41

## 2017-03-25 RX ADMIN — PROPOFOL 30 MCG/KG/MIN: 10 INJECTION, EMULSION INTRAVENOUS at 19:59

## 2017-03-25 RX ADMIN — Medication 10 ML: at 08:14

## 2017-03-25 RX ADMIN — LORAZEPAM 2 MG: 2 INJECTION INTRAMUSCULAR; INTRAVENOUS at 18:29

## 2017-03-25 RX ADMIN — PHENYTOIN SODIUM 100 MG: 50 INJECTION INTRAMUSCULAR; INTRAVENOUS at 03:30

## 2017-03-25 RX ADMIN — FENTANYL CITRATE 100 MCG: 50 INJECTION, SOLUTION INTRAMUSCULAR; INTRAVENOUS at 16:16

## 2017-03-25 RX ADMIN — LEVOFLOXACIN 750 MG: 5 INJECTION, SOLUTION INTRAVENOUS at 18:36

## 2017-03-25 RX ADMIN — LORAZEPAM 2 MG: 2 INJECTION INTRAMUSCULAR; INTRAVENOUS at 20:29

## 2017-03-25 RX ADMIN — POTASSIUM CHLORIDE 20 MEQ: 29.8 INJECTION, SOLUTION INTRAVENOUS at 09:50

## 2017-03-25 RX ADMIN — POTASSIUM CHLORIDE 20 MEQ: 29.8 INJECTION, SOLUTION INTRAVENOUS at 07:51

## 2017-03-25 RX ADMIN — PROPOFOL 20 MCG/KG/MIN: 10 INJECTION, EMULSION INTRAVENOUS at 14:57

## 2017-03-25 RX ADMIN — INSULIN LISPRO 2 UNITS: 100 INJECTION, SOLUTION INTRAVENOUS; SUBCUTANEOUS at 20:54

## 2017-03-25 RX ADMIN — CEFTRIAXONE SODIUM 1 G: 1 INJECTION, POWDER, FOR SOLUTION INTRAMUSCULAR; INTRAVENOUS at 15:37

## 2017-03-25 RX ADMIN — FENTANYL CITRATE 100 MCG: 50 INJECTION, SOLUTION INTRAMUSCULAR; INTRAVENOUS at 04:00

## 2017-03-25 RX ADMIN — FENTANYL CITRATE 100 MCG: 50 INJECTION, SOLUTION INTRAMUSCULAR; INTRAVENOUS at 07:39

## 2017-03-25 RX ADMIN — FENTANYL CITRATE 100 MCG: 50 INJECTION, SOLUTION INTRAMUSCULAR; INTRAVENOUS at 18:22

## 2017-03-25 RX ADMIN — METOPROLOL TARTRATE 25 MG: 25 TABLET ORAL at 08:13

## 2017-03-25 RX ADMIN — Medication 10 ML: at 08:20

## 2017-03-25 RX ADMIN — LORAZEPAM 2 MG: 2 INJECTION INTRAMUSCULAR; INTRAVENOUS at 16:19

## 2017-03-25 RX ADMIN — HEPARIN SODIUM 5000 UNITS: 5000 INJECTION, SOLUTION INTRAVENOUS; SUBCUTANEOUS at 06:11

## 2017-03-25 RX ADMIN — Medication 10 ML: at 20:26

## 2017-03-25 RX ADMIN — LORAZEPAM 1 MG: 1 TABLET ORAL at 17:01

## 2017-03-25 RX ADMIN — LORAZEPAM 1 MG: 1 TABLET ORAL at 07:41

## 2017-03-25 RX ADMIN — PHENYTOIN SODIUM 100 MG: 50 INJECTION INTRAMUSCULAR; INTRAVENOUS at 10:39

## 2017-03-25 RX ADMIN — INSULIN LISPRO 2 UNITS: 100 INJECTION, SOLUTION INTRAVENOUS; SUBCUTANEOUS at 17:08

## 2017-03-25 RX ADMIN — PHENYTOIN SODIUM 100 MG: 50 INJECTION INTRAMUSCULAR; INTRAVENOUS at 18:12

## 2017-03-25 RX ADMIN — INSULIN LISPRO 2 UNITS: 100 INJECTION, SOLUTION INTRAVENOUS; SUBCUTANEOUS at 12:23

## 2017-03-25 ASSESSMENT — PULMONARY FUNCTION TESTS
PIF_VALUE: 12
PIF_VALUE: 17
PIF_VALUE: 19
PIF_VALUE: 11
PIF_VALUE: 11
PIF_VALUE: 12
PIF_VALUE: 18
PIF_VALUE: 11

## 2017-03-26 LAB
ABSOLUTE EOS #: 0.2 K/UL (ref 0–0.4)
ABSOLUTE LYMPH #: 1 K/UL (ref 1–4.8)
ABSOLUTE MONO #: 0.8 K/UL (ref 0.1–1.2)
ANION GAP SERPL CALCULATED.3IONS-SCNC: 12 MMOL/L (ref 9–17)
BASOPHILS # BLD: 1 % (ref 0–2)
BASOPHILS ABSOLUTE: 0.1 K/UL (ref 0–0.2)
BUN BLDV-MCNC: 8 MG/DL (ref 8–23)
BUN/CREAT BLD: ABNORMAL (ref 9–20)
CALCIUM SERPL-MCNC: 8.7 MG/DL (ref 8.6–10.4)
CHLORIDE BLD-SCNC: 106 MMOL/L (ref 98–107)
CO2: 22 MMOL/L (ref 20–31)
CREAT SERPL-MCNC: 0.53 MG/DL (ref 0.7–1.2)
DIFFERENTIAL TYPE: ABNORMAL
EOSINOPHILS RELATIVE PERCENT: 2 % (ref 1–4)
FIO2: 30
GFR AFRICAN AMERICAN: >60 ML/MIN
GFR NON-AFRICAN AMERICAN: >60 ML/MIN
GFR SERPL CREATININE-BSD FRML MDRD: ABNORMAL ML/MIN/{1.73_M2}
GFR SERPL CREATININE-BSD FRML MDRD: ABNORMAL ML/MIN/{1.73_M2}
GLUCOSE BLD-MCNC: 170 MG/DL (ref 75–110)
GLUCOSE BLD-MCNC: 179 MG/DL (ref 75–110)
GLUCOSE BLD-MCNC: 183 MG/DL (ref 75–110)
GLUCOSE BLD-MCNC: 190 MG/DL (ref 75–110)
GLUCOSE BLD-MCNC: 192 MG/DL (ref 75–110)
GLUCOSE BLD-MCNC: 215 MG/DL (ref 75–110)
GLUCOSE BLD-MCNC: 226 MG/DL (ref 70–99)
HCT VFR BLD CALC: 39.4 % (ref 41–53)
HEMOGLOBIN: 13.3 G/DL (ref 13.5–17.5)
LYMPHOCYTES # BLD: 11 % (ref 24–44)
MAGNESIUM: 2.2 MG/DL (ref 1.6–2.6)
MCH RBC QN AUTO: 30.4 PG (ref 26–34)
MCHC RBC AUTO-ENTMCNC: 33.8 G/DL (ref 31–37)
MCV RBC AUTO: 89.7 FL (ref 80–100)
MONOCYTES # BLD: 9 % (ref 2–11)
NEGATIVE BASE EXCESS, ART: ABNORMAL (ref 0–2)
O2 DEVICE/FLOW/%: ABNORMAL
PATIENT TEMP: ABNORMAL
PDW BLD-RTO: 14.9 % (ref 12.5–15.4)
PHOSPHORUS: 2.3 MG/DL (ref 2.5–4.5)
PLATELET # BLD: ABNORMAL K/UL (ref 140–450)
PLATELET ESTIMATE: ABNORMAL
PMV BLD AUTO: ABNORMAL FL (ref 6–12)
POC HCO3: 24.3 MMOL/L (ref 22–27)
POC O2 SATURATION: 91 %
POC PCO2 TEMP: ABNORMAL MM HG
POC PCO2: 32 MM HG (ref 32–45)
POC PH TEMP: ABNORMAL
POC PH: 7.48 (ref 7.35–7.45)
POC PO2 TEMP: ABNORMAL MM HG
POC PO2: 55 MM HG (ref 75–95)
POSITIVE BASE EXCESS, ART: 1 (ref 0–2)
POTASSIUM SERPL-SCNC: 3.2 MMOL/L (ref 3.7–5.3)
RBC # BLD: 4.39 M/UL (ref 4.5–5.9)
RBC # BLD: ABNORMAL 10*6/UL
SEG NEUTROPHILS: 77 % (ref 36–66)
SEGMENTED NEUTROPHILS ABSOLUTE COUNT: 7 K/UL (ref 1.8–7.7)
SODIUM BLD-SCNC: 140 MMOL/L (ref 135–144)
TCO2 (CALC), ART: 25 MM HG (ref 23–28)
TOTAL CK: 1263 U/L (ref 39–308)
TSH SERPL DL<=0.05 MIU/L-ACNC: 1.12 MIU/L (ref 0.3–5)
WBC # BLD: 9.1 K/UL (ref 3.5–11)
WBC # BLD: ABNORMAL 10*3/UL

## 2017-03-26 PROCEDURE — 6360000002 HC RX W HCPCS: Performed by: INTERNAL MEDICINE

## 2017-03-26 PROCEDURE — 6360000002 HC RX W HCPCS: Performed by: PSYCHIATRY & NEUROLOGY

## 2017-03-26 PROCEDURE — 6370000000 HC RX 637 (ALT 250 FOR IP): Performed by: INTERNAL MEDICINE

## 2017-03-26 PROCEDURE — 2580000003 HC RX 258: Performed by: INTERNAL MEDICINE

## 2017-03-26 PROCEDURE — 99233 SBSQ HOSP IP/OBS HIGH 50: CPT | Performed by: PSYCHIATRY & NEUROLOGY

## 2017-03-26 PROCEDURE — 99291 CRITICAL CARE FIRST HOUR: CPT | Performed by: INTERNAL MEDICINE

## 2017-03-26 PROCEDURE — 36600 WITHDRAWAL OF ARTERIAL BLOOD: CPT

## 2017-03-26 PROCEDURE — 80048 BASIC METABOLIC PNL TOTAL CA: CPT

## 2017-03-26 PROCEDURE — 2500000003 HC RX 250 WO HCPCS: Performed by: INTERNAL MEDICINE

## 2017-03-26 PROCEDURE — C9113 INJ PANTOPRAZOLE SODIUM, VIA: HCPCS | Performed by: INTERNAL MEDICINE

## 2017-03-26 PROCEDURE — 2000000000 HC ICU R&B

## 2017-03-26 PROCEDURE — 2580000003 HC RX 258: Performed by: EMERGENCY MEDICINE

## 2017-03-26 PROCEDURE — 94003 VENT MGMT INPAT SUBQ DAY: CPT

## 2017-03-26 PROCEDURE — 36415 COLL VENOUS BLD VENIPUNCTURE: CPT

## 2017-03-26 PROCEDURE — 82803 BLOOD GASES ANY COMBINATION: CPT

## 2017-03-26 PROCEDURE — 2580000003 HC RX 258: Performed by: PSYCHIATRY & NEUROLOGY

## 2017-03-26 PROCEDURE — 84100 ASSAY OF PHOSPHORUS: CPT

## 2017-03-26 PROCEDURE — 84443 ASSAY THYROID STIM HORMONE: CPT

## 2017-03-26 PROCEDURE — 6360000002 HC RX W HCPCS: Performed by: EMERGENCY MEDICINE

## 2017-03-26 PROCEDURE — 6370000000 HC RX 637 (ALT 250 FOR IP): Performed by: EMERGENCY MEDICINE

## 2017-03-26 PROCEDURE — 82550 ASSAY OF CK (CPK): CPT

## 2017-03-26 PROCEDURE — 82947 ASSAY GLUCOSE BLOOD QUANT: CPT

## 2017-03-26 PROCEDURE — 6370000000 HC RX 637 (ALT 250 FOR IP): Performed by: HOSPITALIST

## 2017-03-26 PROCEDURE — 94770 HC ETCO2 MONITOR DAILY: CPT

## 2017-03-26 PROCEDURE — 6360000002 HC RX W HCPCS: Performed by: HOSPITALIST

## 2017-03-26 PROCEDURE — 85025 COMPLETE CBC W/AUTO DIFF WBC: CPT

## 2017-03-26 PROCEDURE — 94762 N-INVAS EAR/PLS OXIMTRY CONT: CPT

## 2017-03-26 PROCEDURE — 83735 ASSAY OF MAGNESIUM: CPT

## 2017-03-26 RX ORDER — ASPIRIN 81 MG/1
81 TABLET, CHEWABLE ORAL DAILY
Status: DISCONTINUED | OUTPATIENT
Start: 2017-03-26 | End: 2017-04-05 | Stop reason: HOSPADM

## 2017-03-26 RX ORDER — AMIODARONE HYDROCHLORIDE 200 MG/1
400 TABLET ORAL 2 TIMES DAILY
Status: DISCONTINUED | OUTPATIENT
Start: 2017-03-26 | End: 2017-03-28

## 2017-03-26 RX ORDER — INSULIN GLARGINE 100 [IU]/ML
10 INJECTION, SOLUTION SUBCUTANEOUS NIGHTLY
Status: DISCONTINUED | OUTPATIENT
Start: 2017-03-26 | End: 2017-03-31

## 2017-03-26 RX ADMIN — INSULIN LISPRO 2 UNITS: 100 INJECTION, SOLUTION INTRAVENOUS; SUBCUTANEOUS at 05:30

## 2017-03-26 RX ADMIN — PHENYTOIN SODIUM 100 MG: 50 INJECTION INTRAMUSCULAR; INTRAVENOUS at 03:10

## 2017-03-26 RX ADMIN — LORAZEPAM 2 MG: 2 INJECTION INTRAMUSCULAR; INTRAVENOUS at 10:10

## 2017-03-26 RX ADMIN — FENTANYL CITRATE 100 MCG: 50 INJECTION, SOLUTION INTRAMUSCULAR; INTRAVENOUS at 07:48

## 2017-03-26 RX ADMIN — FENTANYL CITRATE 100 MCG: 50 INJECTION, SOLUTION INTRAMUSCULAR; INTRAVENOUS at 19:50

## 2017-03-26 RX ADMIN — METOPROLOL TARTRATE 25 MG: 25 TABLET ORAL at 20:07

## 2017-03-26 RX ADMIN — LORAZEPAM 1 MG: 1 TABLET ORAL at 07:32

## 2017-03-26 RX ADMIN — METOPROLOL TARTRATE 25 MG: 25 TABLET ORAL at 07:35

## 2017-03-26 RX ADMIN — AMIODARONE HYDROCHLORIDE 400 MG: 200 TABLET ORAL at 20:07

## 2017-03-26 RX ADMIN — FENTANYL CITRATE 100 MCG: 50 INJECTION, SOLUTION INTRAMUSCULAR; INTRAVENOUS at 17:51

## 2017-03-26 RX ADMIN — Medication 10 ML: at 09:48

## 2017-03-26 RX ADMIN — FENTANYL CITRATE 100 MCG: 50 INJECTION, SOLUTION INTRAMUSCULAR; INTRAVENOUS at 15:19

## 2017-03-26 RX ADMIN — PROPOFOL 30 MCG/KG/MIN: 10 INJECTION, EMULSION INTRAVENOUS at 03:15

## 2017-03-26 RX ADMIN — LORAZEPAM 1 MG: 1 TABLET ORAL at 13:38

## 2017-03-26 RX ADMIN — FENTANYL CITRATE 100 MCG: 50 INJECTION, SOLUTION INTRAMUSCULAR; INTRAVENOUS at 11:46

## 2017-03-26 RX ADMIN — Medication 10 ML: at 20:07

## 2017-03-26 RX ADMIN — INSULIN LISPRO 2 UNITS: 100 INJECTION, SOLUTION INTRAVENOUS; SUBCUTANEOUS at 01:30

## 2017-03-26 RX ADMIN — INSULIN LISPRO 4 UNITS: 100 INJECTION, SOLUTION INTRAVENOUS; SUBCUTANEOUS at 18:02

## 2017-03-26 RX ADMIN — LORAZEPAM 1 MG: 1 TABLET ORAL at 17:47

## 2017-03-26 RX ADMIN — LORAZEPAM 1 MG: 1 TABLET ORAL at 03:18

## 2017-03-26 RX ADMIN — FENTANYL CITRATE 100 MCG: 50 INJECTION, SOLUTION INTRAMUSCULAR; INTRAVENOUS at 16:27

## 2017-03-26 RX ADMIN — PHENYTOIN SODIUM 100 MG: 50 INJECTION INTRAMUSCULAR; INTRAVENOUS at 10:45

## 2017-03-26 RX ADMIN — Medication 10 ML: at 09:49

## 2017-03-26 RX ADMIN — ASPIRIN 81 MG: 81 TABLET, CHEWABLE ORAL at 13:03

## 2017-03-26 RX ADMIN — INSULIN LISPRO 2 UNITS: 100 INJECTION, SOLUTION INTRAVENOUS; SUBCUTANEOUS at 12:43

## 2017-03-26 RX ADMIN — PHENYTOIN SODIUM 100 MG: 50 INJECTION INTRAMUSCULAR; INTRAVENOUS at 18:42

## 2017-03-26 RX ADMIN — LORAZEPAM 2 MG: 2 INJECTION INTRAMUSCULAR; INTRAVENOUS at 04:01

## 2017-03-26 RX ADMIN — CEFTRIAXONE SODIUM 2 G: 2 INJECTION, POWDER, FOR SOLUTION INTRAMUSCULAR; INTRAVENOUS at 15:24

## 2017-03-26 RX ADMIN — HEPARIN SODIUM 5000 UNITS: 5000 INJECTION, SOLUTION INTRAVENOUS; SUBCUTANEOUS at 15:11

## 2017-03-26 RX ADMIN — LORAZEPAM 2 MG: 2 INJECTION INTRAMUSCULAR; INTRAVENOUS at 17:47

## 2017-03-26 RX ADMIN — LORAZEPAM 2 MG: 2 INJECTION INTRAMUSCULAR; INTRAVENOUS at 15:21

## 2017-03-26 RX ADMIN — INSULIN LISPRO 2 UNITS: 100 INJECTION, SOLUTION INTRAVENOUS; SUBCUTANEOUS at 20:47

## 2017-03-26 RX ADMIN — PROPOFOL 25 MCG/KG/MIN: 10 INJECTION, EMULSION INTRAVENOUS at 11:42

## 2017-03-26 RX ADMIN — INSULIN GLARGINE 10 UNITS: 100 INJECTION, SOLUTION SUBCUTANEOUS at 20:47

## 2017-03-26 RX ADMIN — LEVETIRACETAM 500 MG: 500 INJECTION, SOLUTION, CONCENTRATE INTRAVENOUS at 05:53

## 2017-03-26 RX ADMIN — LORAZEPAM 2 MG: 2 INJECTION INTRAMUSCULAR; INTRAVENOUS at 22:48

## 2017-03-26 RX ADMIN — POTASSIUM CHLORIDE 20 MEQ: 29.8 INJECTION, SOLUTION INTRAVENOUS at 08:40

## 2017-03-26 RX ADMIN — HEPARIN SODIUM 5000 UNITS: 5000 INJECTION, SOLUTION INTRAVENOUS; SUBCUTANEOUS at 21:30

## 2017-03-26 RX ADMIN — FENTANYL CITRATE 100 MCG: 50 INJECTION, SOLUTION INTRAMUSCULAR; INTRAVENOUS at 23:36

## 2017-03-26 RX ADMIN — LEVETIRACETAM 500 MG: 500 INJECTION, SOLUTION, CONCENTRATE INTRAVENOUS at 17:46

## 2017-03-26 RX ADMIN — INSULIN LISPRO 2 UNITS: 100 INJECTION, SOLUTION INTRAVENOUS; SUBCUTANEOUS at 09:57

## 2017-03-26 RX ADMIN — AMIODARONE HYDROCHLORIDE 400 MG: 200 TABLET ORAL at 13:03

## 2017-03-26 RX ADMIN — LORAZEPAM 2 MG: 2 INJECTION INTRAMUSCULAR; INTRAVENOUS at 07:43

## 2017-03-26 RX ADMIN — SODIUM PHOSPHATE, MONOBASIC, MONOHYDRATE AND SODIUM PHOSPHATE, DIBASIC, ANHYDROUS 10 MMOL: 276; 142 INJECTION, SOLUTION INTRAVENOUS at 10:24

## 2017-03-26 RX ADMIN — HEPARIN SODIUM 5000 UNITS: 5000 INJECTION, SOLUTION INTRAVENOUS; SUBCUTANEOUS at 05:53

## 2017-03-26 RX ADMIN — PANTOPRAZOLE SODIUM 40 MG: 40 INJECTION, POWDER, FOR SOLUTION INTRAVENOUS at 09:48

## 2017-03-26 RX ADMIN — LORAZEPAM 2 MG: 2 INJECTION INTRAMUSCULAR; INTRAVENOUS at 01:55

## 2017-03-26 RX ADMIN — POTASSIUM CHLORIDE 20 MEQ: 29.8 INJECTION, SOLUTION INTRAVENOUS at 09:49

## 2017-03-26 ASSESSMENT — PULMONARY FUNCTION TESTS
PIF_VALUE: 13
PIF_VALUE: 12
PIF_VALUE: 16
PIF_VALUE: 11
PIF_VALUE: 12
PIF_VALUE: 12
PIF_VALUE: 14
PIF_VALUE: 16

## 2017-03-26 ASSESSMENT — PAIN SCALES - GENERAL: PAINLEVEL_OUTOF10: 0

## 2017-03-27 LAB
-: ABNORMAL
ABSOLUTE EOS #: 0.2 K/UL (ref 0–0.4)
ABSOLUTE LYMPH #: 1.2 K/UL (ref 1–4.8)
ABSOLUTE MONO #: 0.9 K/UL (ref 0.1–1.2)
AMORPHOUS: ABNORMAL
ANION GAP SERPL CALCULATED.3IONS-SCNC: 11 MMOL/L (ref 9–17)
BACTERIA: ABNORMAL
BASOPHILS # BLD: 0 % (ref 0–2)
BASOPHILS ABSOLUTE: 0 K/UL (ref 0–0.2)
BILIRUBIN URINE: NEGATIVE
BUN BLDV-MCNC: 10 MG/DL (ref 8–23)
BUN/CREAT BLD: ABNORMAL (ref 9–20)
CALCIUM SERPL-MCNC: 8.1 MG/DL (ref 8.6–10.4)
CASTS UA: ABNORMAL /LPF (ref 0–2)
CHLORIDE BLD-SCNC: 104 MMOL/L (ref 98–107)
CO2: 24 MMOL/L (ref 20–31)
COLOR: ABNORMAL
COMMENT UA: ABNORMAL
CREAT SERPL-MCNC: 0.48 MG/DL (ref 0.7–1.2)
CRYSTALS, UA: ABNORMAL /HPF
CULTURE: ABNORMAL
CULTURE: ABNORMAL
DIFFERENTIAL TYPE: ABNORMAL
DIRECT EXAM: ABNORMAL
EOSINOPHILS RELATIVE PERCENT: 2 % (ref 1–4)
EPITHELIAL CELLS UA: ABNORMAL /HPF (ref 0–5)
FIO2: 30
GFR AFRICAN AMERICAN: >60 ML/MIN
GFR NON-AFRICAN AMERICAN: >60 ML/MIN
GFR SERPL CREATININE-BSD FRML MDRD: ABNORMAL ML/MIN/{1.73_M2}
GFR SERPL CREATININE-BSD FRML MDRD: ABNORMAL ML/MIN/{1.73_M2}
GLUCOSE BLD-MCNC: 126 MG/DL (ref 75–110)
GLUCOSE BLD-MCNC: 141 MG/DL (ref 75–110)
GLUCOSE BLD-MCNC: 141 MG/DL (ref 75–110)
GLUCOSE BLD-MCNC: 146 MG/DL (ref 75–110)
GLUCOSE BLD-MCNC: 146 MG/DL (ref 75–110)
GLUCOSE BLD-MCNC: 157 MG/DL (ref 75–110)
GLUCOSE BLD-MCNC: 187 MG/DL (ref 70–99)
GLUCOSE URINE: ABNORMAL
HCO3, MIXED: 24.6 MMOL/L (ref 23–29)
HCT VFR BLD CALC: 37.3 % (ref 41–53)
HEMOGLOBIN: 12.7 G/DL (ref 13.5–17.5)
KETONES, URINE: ABNORMAL
LEUKOCYTE ESTERASE, URINE: ABNORMAL
LYMPHOCYTES # BLD: 14 % (ref 24–44)
Lab: ABNORMAL
MAGNESIUM: 2 MG/DL (ref 1.6–2.6)
MCH RBC QN AUTO: 30.2 PG (ref 26–34)
MCHC RBC AUTO-ENTMCNC: 34 G/DL (ref 31–37)
MCV RBC AUTO: 88.8 FL (ref 80–100)
MONOCYTES # BLD: 10 % (ref 2–11)
MUCUS: ABNORMAL
NEGATIVE BASE EXCESS, MIXED: ABNORMAL (ref 0–2)
NITRITE, URINE: POSITIVE
O2 DEVICE/FLOW/%: ABNORMAL
O2 SAT, MIXED: 87 %
ORGANISM: ABNORMAL
OTHER OBSERVATIONS UA: ABNORMAL
PATIENT TEMP: ABNORMAL
PCO2 MIXED: 34 MM HG (ref 42–52)
PDW BLD-RTO: 14.6 % (ref 12.5–15.4)
PH UA: 5.5 (ref 5–8)
PH, MIXED: 7.47 (ref 7.31–7.41)
PHENYTOIN DATE LAST DOSE: NORMAL
PHENYTOIN DOSE AMOUNT: NORMAL
PHENYTOIN DOSE TIME: NORMAL
PHENYTOIN FREE: 1.6 UG/ML (ref 1–2)
PHENYTOIN LEVEL: 10.7 UG/ML (ref 10–20)
PHOSPHORUS: 2.8 MG/DL (ref 2.5–4.5)
PLATELET # BLD: 160 K/UL (ref 140–450)
PLATELET ESTIMATE: ABNORMAL
PMV BLD AUTO: 11.2 FL (ref 6–12)
PO2 MIXED: 49 MM HG (ref 35–45)
POC PCO2 TEMP: ABNORMAL MM HG
POC PH TEMP: ABNORMAL
POC PO2 TEMP: ABNORMAL MM HG
POSITIVE BASE EXCESS, MIXED: 1 (ref 0–2)
POTASSIUM SERPL-SCNC: 3.3 MMOL/L (ref 3.7–5.3)
PROTEIN UA: NEGATIVE
RBC # BLD: 4.2 M/UL (ref 4.5–5.9)
RBC # BLD: ABNORMAL 10*6/UL
RBC UA: ABNORMAL /HPF (ref 0–2)
RENAL EPITHELIAL, UA: ABNORMAL /HPF
SEG NEUTROPHILS: 74 % (ref 36–66)
SEGMENTED NEUTROPHILS ABSOLUTE COUNT: 6.2 K/UL (ref 1.8–7.7)
SODIUM BLD-SCNC: 139 MMOL/L (ref 135–144)
SPECIFIC GRAVITY UA: 1.03 (ref 1–1.03)
SPECIMEN DESCRIPTION: ABNORMAL
STATUS: ABNORMAL
TCO2 CALC MIXED: 26 MM HG (ref 24–30)
TOTAL CK: 615 U/L (ref 39–308)
TRICHOMONAS: ABNORMAL
TURBIDITY: CLEAR
URINE HGB: NEGATIVE
UROBILINOGEN, URINE: NORMAL
WBC # BLD: 8.5 K/UL (ref 3.5–11)
WBC # BLD: ABNORMAL 10*3/UL
WBC UA: ABNORMAL /HPF (ref 0–5)
YEAST: ABNORMAL

## 2017-03-27 PROCEDURE — 2580000003 HC RX 258: Performed by: PSYCHIATRY & NEUROLOGY

## 2017-03-27 PROCEDURE — 6370000000 HC RX 637 (ALT 250 FOR IP): Performed by: INTERNAL MEDICINE

## 2017-03-27 PROCEDURE — 2000000000 HC ICU R&B

## 2017-03-27 PROCEDURE — C9113 INJ PANTOPRAZOLE SODIUM, VIA: HCPCS | Performed by: INTERNAL MEDICINE

## 2017-03-27 PROCEDURE — 6370000000 HC RX 637 (ALT 250 FOR IP): Performed by: EMERGENCY MEDICINE

## 2017-03-27 PROCEDURE — 6360000002 HC RX W HCPCS: Performed by: INTERNAL MEDICINE

## 2017-03-27 PROCEDURE — 6360000002 HC RX W HCPCS: Performed by: EMERGENCY MEDICINE

## 2017-03-27 PROCEDURE — 36415 COLL VENOUS BLD VENIPUNCTURE: CPT

## 2017-03-27 PROCEDURE — 51701 INSERT BLADDER CATHETER: CPT

## 2017-03-27 PROCEDURE — 6370000000 HC RX 637 (ALT 250 FOR IP): Performed by: HOSPITALIST

## 2017-03-27 PROCEDURE — 81001 URINALYSIS AUTO W/SCOPE: CPT

## 2017-03-27 PROCEDURE — 94770 HC ETCO2 MONITOR DAILY: CPT

## 2017-03-27 PROCEDURE — 51702 INSERT TEMP BLADDER CATH: CPT

## 2017-03-27 PROCEDURE — 84100 ASSAY OF PHOSPHORUS: CPT

## 2017-03-27 PROCEDURE — 2580000003 HC RX 258: Performed by: EMERGENCY MEDICINE

## 2017-03-27 PROCEDURE — 99291 CRITICAL CARE FIRST HOUR: CPT | Performed by: INTERNAL MEDICINE

## 2017-03-27 PROCEDURE — 2580000003 HC RX 258: Performed by: INTERNAL MEDICINE

## 2017-03-27 PROCEDURE — 85025 COMPLETE CBC W/AUTO DIFF WBC: CPT

## 2017-03-27 PROCEDURE — 83735 ASSAY OF MAGNESIUM: CPT

## 2017-03-27 PROCEDURE — 6360000002 HC RX W HCPCS: Performed by: PSYCHIATRY & NEUROLOGY

## 2017-03-27 PROCEDURE — 80048 BASIC METABOLIC PNL TOTAL CA: CPT

## 2017-03-27 PROCEDURE — 36600 WITHDRAWAL OF ARTERIAL BLOOD: CPT

## 2017-03-27 PROCEDURE — 94003 VENT MGMT INPAT SUBQ DAY: CPT

## 2017-03-27 PROCEDURE — 82803 BLOOD GASES ANY COMBINATION: CPT

## 2017-03-27 PROCEDURE — 82947 ASSAY GLUCOSE BLOOD QUANT: CPT

## 2017-03-27 PROCEDURE — 80186 ASSAY OF PHENYTOIN FREE: CPT

## 2017-03-27 PROCEDURE — 99231 SBSQ HOSP IP/OBS SF/LOW 25: CPT

## 2017-03-27 PROCEDURE — 6360000002 HC RX W HCPCS: Performed by: HOSPITALIST

## 2017-03-27 PROCEDURE — 94762 N-INVAS EAR/PLS OXIMTRY CONT: CPT

## 2017-03-27 PROCEDURE — 51798 US URINE CAPACITY MEASURE: CPT

## 2017-03-27 PROCEDURE — 80185 ASSAY OF PHENYTOIN TOTAL: CPT

## 2017-03-27 PROCEDURE — 87086 URINE CULTURE/COLONY COUNT: CPT

## 2017-03-27 PROCEDURE — 82550 ASSAY OF CK (CPK): CPT

## 2017-03-27 RX ORDER — METOCLOPRAMIDE HYDROCHLORIDE 5 MG/ML
5 INJECTION INTRAMUSCULAR; INTRAVENOUS EVERY 6 HOURS
Status: DISCONTINUED | OUTPATIENT
Start: 2017-03-27 | End: 2017-03-29

## 2017-03-27 RX ORDER — SENNA PLUS 8.6 MG/1
1 TABLET ORAL NIGHTLY
Status: DISCONTINUED | OUTPATIENT
Start: 2017-03-27 | End: 2017-04-05 | Stop reason: HOSPADM

## 2017-03-27 RX ORDER — TAMSULOSIN HYDROCHLORIDE 0.4 MG/1
0.4 CAPSULE ORAL DAILY
Status: DISCONTINUED | OUTPATIENT
Start: 2017-03-27 | End: 2017-04-05 | Stop reason: HOSPADM

## 2017-03-27 RX ORDER — OXYCODONE HYDROCHLORIDE 5 MG/1
10 TABLET ORAL EVERY 6 HOURS
Status: DISCONTINUED | OUTPATIENT
Start: 2017-03-27 | End: 2017-03-30

## 2017-03-27 RX ADMIN — INSULIN LISPRO 2 UNITS: 100 INJECTION, SOLUTION INTRAVENOUS; SUBCUTANEOUS at 18:45

## 2017-03-27 RX ADMIN — PROPOFOL 30 MCG/KG/MIN: 10 INJECTION, EMULSION INTRAVENOUS at 18:25

## 2017-03-27 RX ADMIN — Medication 2 G: at 18:52

## 2017-03-27 RX ADMIN — INSULIN LISPRO 2 UNITS: 100 INJECTION, SOLUTION INTRAVENOUS; SUBCUTANEOUS at 00:56

## 2017-03-27 RX ADMIN — TAMSULOSIN HYDROCHLORIDE 0.4 MG: 0.4 CAPSULE ORAL at 20:34

## 2017-03-27 RX ADMIN — FENTANYL CITRATE 100 MCG: 50 INJECTION, SOLUTION INTRAMUSCULAR; INTRAVENOUS at 23:57

## 2017-03-27 RX ADMIN — INSULIN GLARGINE 10 UNITS: 100 INJECTION, SOLUTION SUBCUTANEOUS at 20:38

## 2017-03-27 RX ADMIN — SODIUM CHLORIDE 50 ML/HR: 9 INJECTION, SOLUTION INTRAVENOUS at 08:30

## 2017-03-27 RX ADMIN — INSULIN LISPRO 2 UNITS: 100 INJECTION, SOLUTION INTRAVENOUS; SUBCUTANEOUS at 04:30

## 2017-03-27 RX ADMIN — POTASSIUM CHLORIDE 10 MEQ: 10 INJECTION, SOLUTION INTRAVENOUS at 14:06

## 2017-03-27 RX ADMIN — METOCLOPRAMIDE 5 MG: 5 INJECTION, SOLUTION INTRAMUSCULAR; INTRAVENOUS at 08:58

## 2017-03-27 RX ADMIN — METOCLOPRAMIDE 5 MG: 5 INJECTION, SOLUTION INTRAMUSCULAR; INTRAVENOUS at 18:28

## 2017-03-27 RX ADMIN — LORAZEPAM 2 MG: 2 INJECTION INTRAMUSCULAR; INTRAVENOUS at 16:34

## 2017-03-27 RX ADMIN — FENTANYL CITRATE 100 MCG: 50 INJECTION, SOLUTION INTRAMUSCULAR; INTRAVENOUS at 18:49

## 2017-03-27 RX ADMIN — HEPARIN SODIUM 5000 UNITS: 5000 INJECTION, SOLUTION INTRAVENOUS; SUBCUTANEOUS at 21:21

## 2017-03-27 RX ADMIN — PHENYTOIN SODIUM 100 MG: 50 INJECTION INTRAMUSCULAR; INTRAVENOUS at 19:30

## 2017-03-27 RX ADMIN — FENTANYL CITRATE 100 MCG: 50 INJECTION, SOLUTION INTRAMUSCULAR; INTRAVENOUS at 16:34

## 2017-03-27 RX ADMIN — AMIODARONE HYDROCHLORIDE 400 MG: 200 TABLET ORAL at 20:32

## 2017-03-27 RX ADMIN — POTASSIUM CHLORIDE 10 MEQ: 10 INJECTION, SOLUTION INTRAVENOUS at 08:19

## 2017-03-27 RX ADMIN — PHENYTOIN SODIUM 100 MG: 50 INJECTION INTRAMUSCULAR; INTRAVENOUS at 02:30

## 2017-03-27 RX ADMIN — POTASSIUM CHLORIDE 10 MEQ: 10 INJECTION, SOLUTION INTRAVENOUS at 06:33

## 2017-03-27 RX ADMIN — FENTANYL CITRATE 100 MCG: 50 INJECTION, SOLUTION INTRAMUSCULAR; INTRAVENOUS at 13:12

## 2017-03-27 RX ADMIN — Medication 2 G: at 11:27

## 2017-03-27 RX ADMIN — FENTANYL CITRATE 100 MCG: 50 INJECTION, SOLUTION INTRAMUSCULAR; INTRAVENOUS at 05:50

## 2017-03-27 RX ADMIN — FENTANYL CITRATE 100 MCG: 50 INJECTION, SOLUTION INTRAMUSCULAR; INTRAVENOUS at 20:25

## 2017-03-27 RX ADMIN — FENTANYL CITRATE 100 MCG: 50 INJECTION, SOLUTION INTRAMUSCULAR; INTRAVENOUS at 04:22

## 2017-03-27 RX ADMIN — FENTANYL CITRATE 100 MCG: 50 INJECTION, SOLUTION INTRAMUSCULAR; INTRAVENOUS at 08:18

## 2017-03-27 RX ADMIN — Medication 10 ML: at 08:44

## 2017-03-27 RX ADMIN — OXYCODONE HYDROCHLORIDE 10 MG: 5 TABLET ORAL at 08:57

## 2017-03-27 RX ADMIN — INSULIN LISPRO 2 UNITS: 100 INJECTION, SOLUTION INTRAVENOUS; SUBCUTANEOUS at 09:11

## 2017-03-27 RX ADMIN — OXYCODONE HYDROCHLORIDE 10 MG: 5 TABLET ORAL at 20:00

## 2017-03-27 RX ADMIN — LORAZEPAM 1 MG: 2 INJECTION INTRAMUSCULAR; INTRAVENOUS at 19:12

## 2017-03-27 RX ADMIN — SODIUM CHLORIDE: 9 INJECTION, SOLUTION INTRAVENOUS at 02:08

## 2017-03-27 RX ADMIN — HEPARIN SODIUM 5000 UNITS: 5000 INJECTION, SOLUTION INTRAVENOUS; SUBCUTANEOUS at 05:30

## 2017-03-27 RX ADMIN — OXYCODONE HYDROCHLORIDE 10 MG: 5 TABLET ORAL at 14:06

## 2017-03-27 RX ADMIN — SENNA 8.6 MG: 8.6 TABLET, COATED ORAL at 08:57

## 2017-03-27 RX ADMIN — SENNA 8.6 MG: 8.6 TABLET, COATED ORAL at 22:30

## 2017-03-27 RX ADMIN — LORAZEPAM 1 MG: 1 TABLET ORAL at 23:58

## 2017-03-27 RX ADMIN — PROPOFOL 10 MCG/KG/MIN: 10 INJECTION, EMULSION INTRAVENOUS at 02:11

## 2017-03-27 RX ADMIN — INSULIN LISPRO 2 UNITS: 100 INJECTION, SOLUTION INTRAVENOUS; SUBCUTANEOUS at 20:43

## 2017-03-27 RX ADMIN — PHENYTOIN SODIUM 100 MG: 50 INJECTION INTRAMUSCULAR; INTRAVENOUS at 11:30

## 2017-03-27 RX ADMIN — PROPOFOL 30 MCG/KG/MIN: 10 INJECTION, EMULSION INTRAVENOUS at 09:52

## 2017-03-27 RX ADMIN — LEVETIRACETAM 500 MG: 500 INJECTION, SOLUTION, CONCENTRATE INTRAVENOUS at 18:28

## 2017-03-27 RX ADMIN — POTASSIUM CHLORIDE 10 MEQ: 10 INJECTION, SOLUTION INTRAVENOUS at 13:00

## 2017-03-27 RX ADMIN — HEPARIN SODIUM 5000 UNITS: 5000 INJECTION, SOLUTION INTRAVENOUS; SUBCUTANEOUS at 14:27

## 2017-03-27 RX ADMIN — LORAZEPAM 2 MG: 2 INJECTION INTRAMUSCULAR; INTRAVENOUS at 02:44

## 2017-03-27 RX ADMIN — AMIODARONE HYDROCHLORIDE 400 MG: 200 TABLET ORAL at 08:43

## 2017-03-27 RX ADMIN — LEVETIRACETAM 500 MG: 500 INJECTION, SOLUTION, CONCENTRATE INTRAVENOUS at 05:45

## 2017-03-27 RX ADMIN — FENTANYL CITRATE 100 MCG: 50 INJECTION, SOLUTION INTRAMUSCULAR; INTRAVENOUS at 01:08

## 2017-03-27 RX ADMIN — PANTOPRAZOLE SODIUM 40 MG: 40 INJECTION, POWDER, FOR SOLUTION INTRAVENOUS at 08:44

## 2017-03-27 RX ADMIN — ASPIRIN 81 MG: 81 TABLET, CHEWABLE ORAL at 08:44

## 2017-03-27 ASSESSMENT — PULMONARY FUNCTION TESTS
PIF_VALUE: 15
PIF_VALUE: 16
PIF_VALUE: 18
PIF_VALUE: 19
PIF_VALUE: 17
PIF_VALUE: 15

## 2017-03-28 LAB
ABSOLUTE EOS #: 0.3 K/UL (ref 0–0.4)
ABSOLUTE LYMPH #: 1.2 K/UL (ref 1–4.8)
ABSOLUTE MONO #: 1.2 K/UL (ref 0.1–1.2)
ANION GAP SERPL CALCULATED.3IONS-SCNC: 13 MMOL/L (ref 9–17)
BASOPHILS # BLD: 1 % (ref 0–2)
BASOPHILS ABSOLUTE: 0.1 K/UL (ref 0–0.2)
BUN BLDV-MCNC: 11 MG/DL (ref 8–23)
BUN/CREAT BLD: ABNORMAL (ref 9–20)
CALCIUM SERPL-MCNC: 8 MG/DL (ref 8.6–10.4)
CHLORIDE BLD-SCNC: 103 MMOL/L (ref 98–107)
CO2: 22 MMOL/L (ref 20–31)
CREAT SERPL-MCNC: 0.53 MG/DL (ref 0.7–1.2)
CULTURE: NO GROWTH
CULTURE: NORMAL
DIFFERENTIAL TYPE: ABNORMAL
EOSINOPHILS RELATIVE PERCENT: 3 % (ref 1–4)
FIO2: 30
GFR AFRICAN AMERICAN: >60 ML/MIN
GFR NON-AFRICAN AMERICAN: >60 ML/MIN
GFR SERPL CREATININE-BSD FRML MDRD: ABNORMAL ML/MIN/{1.73_M2}
GFR SERPL CREATININE-BSD FRML MDRD: ABNORMAL ML/MIN/{1.73_M2}
GLUCOSE BLD-MCNC: 135 MG/DL (ref 75–110)
GLUCOSE BLD-MCNC: 137 MG/DL (ref 75–110)
GLUCOSE BLD-MCNC: 146 MG/DL (ref 75–110)
GLUCOSE BLD-MCNC: 164 MG/DL (ref 75–110)
GLUCOSE BLD-MCNC: 170 MG/DL (ref 75–110)
GLUCOSE BLD-MCNC: 174 MG/DL (ref 75–110)
GLUCOSE BLD-MCNC: 184 MG/DL (ref 70–99)
HCT VFR BLD CALC: 38.5 % (ref 41–53)
HEMOGLOBIN: 13 G/DL (ref 13.5–17.5)
LYMPHOCYTES # BLD: 11 % (ref 24–44)
Lab: NORMAL
MAGNESIUM: 1.9 MG/DL (ref 1.6–2.6)
MCH RBC QN AUTO: 30.2 PG (ref 26–34)
MCHC RBC AUTO-ENTMCNC: 33.7 G/DL (ref 31–37)
MCV RBC AUTO: 89.6 FL (ref 80–100)
MONOCYTES # BLD: 11 % (ref 2–11)
NEGATIVE BASE EXCESS, ART: ABNORMAL (ref 0–2)
O2 DEVICE/FLOW/%: ABNORMAL
PATIENT TEMP: 37.9
PDW BLD-RTO: 14.7 % (ref 12.5–15.4)
PHOSPHORUS: 3 MG/DL (ref 2.5–4.5)
PLATELET # BLD: 172 K/UL (ref 140–450)
PLATELET ESTIMATE: ABNORMAL
PMV BLD AUTO: 10.3 FL (ref 6–12)
POC HCO3: 24.7 MMOL/L (ref 22–27)
POC O2 SATURATION: 93 %
POC PCO2 TEMP: ABNORMAL MM HG
POC PCO2: 37 MM HG (ref 32–45)
POC PH TEMP: ABNORMAL
POC PH: 7.44 (ref 7.35–7.45)
POC PO2 TEMP: ABNORMAL MM HG
POC PO2: 64 MM HG (ref 75–95)
POSITIVE BASE EXCESS, ART: 1 (ref 0–2)
POTASSIUM SERPL-SCNC: 3.7 MMOL/L (ref 3.7–5.3)
RBC # BLD: 4.3 M/UL (ref 4.5–5.9)
RBC # BLD: ABNORMAL 10*6/UL
SEG NEUTROPHILS: 74 % (ref 36–66)
SEGMENTED NEUTROPHILS ABSOLUTE COUNT: 7.7 K/UL (ref 1.8–7.7)
SODIUM BLD-SCNC: 138 MMOL/L (ref 135–144)
SPECIMEN DESCRIPTION: NORMAL
STATUS: NORMAL
TCO2 (CALC), ART: 26 MM HG (ref 23–28)
TOTAL CK: 348 U/L (ref 39–308)
WBC # BLD: 10.5 K/UL (ref 3.5–11)
WBC # BLD: ABNORMAL 10*3/UL

## 2017-03-28 PROCEDURE — 2580000003 HC RX 258: Performed by: EMERGENCY MEDICINE

## 2017-03-28 PROCEDURE — 6370000000 HC RX 637 (ALT 250 FOR IP): Performed by: INTERNAL MEDICINE

## 2017-03-28 PROCEDURE — 82550 ASSAY OF CK (CPK): CPT

## 2017-03-28 PROCEDURE — 6360000002 HC RX W HCPCS: Performed by: HOSPITALIST

## 2017-03-28 PROCEDURE — 99291 CRITICAL CARE FIRST HOUR: CPT | Performed by: INTERNAL MEDICINE

## 2017-03-28 PROCEDURE — 76937 US GUIDE VASCULAR ACCESS: CPT

## 2017-03-28 PROCEDURE — 2000000000 HC ICU R&B

## 2017-03-28 PROCEDURE — 2580000003 HC RX 258: Performed by: INTERNAL MEDICINE

## 2017-03-28 PROCEDURE — 36415 COLL VENOUS BLD VENIPUNCTURE: CPT

## 2017-03-28 PROCEDURE — 6370000000 HC RX 637 (ALT 250 FOR IP): Performed by: HOSPITALIST

## 2017-03-28 PROCEDURE — 82947 ASSAY GLUCOSE BLOOD QUANT: CPT

## 2017-03-28 PROCEDURE — C9113 INJ PANTOPRAZOLE SODIUM, VIA: HCPCS | Performed by: INTERNAL MEDICINE

## 2017-03-28 PROCEDURE — 94003 VENT MGMT INPAT SUBQ DAY: CPT

## 2017-03-28 PROCEDURE — 80048 BASIC METABOLIC PNL TOTAL CA: CPT

## 2017-03-28 PROCEDURE — 6360000002 HC RX W HCPCS: Performed by: EMERGENCY MEDICINE

## 2017-03-28 PROCEDURE — 85025 COMPLETE CBC W/AUTO DIFF WBC: CPT

## 2017-03-28 PROCEDURE — 6360000002 HC RX W HCPCS: Performed by: INTERNAL MEDICINE

## 2017-03-28 PROCEDURE — 36600 WITHDRAWAL OF ARTERIAL BLOOD: CPT

## 2017-03-28 PROCEDURE — 94770 HC ETCO2 MONITOR DAILY: CPT

## 2017-03-28 PROCEDURE — 84100 ASSAY OF PHOSPHORUS: CPT

## 2017-03-28 PROCEDURE — 6360000002 HC RX W HCPCS: Performed by: PSYCHIATRY & NEUROLOGY

## 2017-03-28 PROCEDURE — 83735 ASSAY OF MAGNESIUM: CPT

## 2017-03-28 PROCEDURE — 6370000000 HC RX 637 (ALT 250 FOR IP): Performed by: EMERGENCY MEDICINE

## 2017-03-28 PROCEDURE — 82803 BLOOD GASES ANY COMBINATION: CPT

## 2017-03-28 PROCEDURE — 2580000003 HC RX 258: Performed by: PSYCHIATRY & NEUROLOGY

## 2017-03-28 RX ORDER — AMIODARONE HYDROCHLORIDE 200 MG/1
200 TABLET ORAL 2 TIMES DAILY
Status: DISCONTINUED | OUTPATIENT
Start: 2017-03-28 | End: 2017-04-05 | Stop reason: HOSPADM

## 2017-03-28 RX ORDER — LORAZEPAM 2 MG/1
4 TABLET ORAL EVERY 6 HOURS
Status: DISCONTINUED | OUTPATIENT
Start: 2017-03-28 | End: 2017-03-29

## 2017-03-28 RX ORDER — LORAZEPAM 2 MG/ML
4 INJECTION INTRAMUSCULAR EVERY 6 HOURS
Status: DISCONTINUED | OUTPATIENT
Start: 2017-03-28 | End: 2017-03-28

## 2017-03-28 RX ADMIN — OXYCODONE HYDROCHLORIDE 10 MG: 5 TABLET ORAL at 08:01

## 2017-03-28 RX ADMIN — Medication 2 G: at 18:19

## 2017-03-28 RX ADMIN — INSULIN LISPRO 2 UNITS: 100 INJECTION, SOLUTION INTRAVENOUS; SUBCUTANEOUS at 16:51

## 2017-03-28 RX ADMIN — METOPROLOL TARTRATE 25 MG: 25 TABLET ORAL at 08:03

## 2017-03-28 RX ADMIN — Medication 2 G: at 12:38

## 2017-03-28 RX ADMIN — AMIODARONE HYDROCHLORIDE 400 MG: 200 TABLET ORAL at 08:03

## 2017-03-28 RX ADMIN — OXYCODONE HYDROCHLORIDE 10 MG: 5 TABLET ORAL at 14:03

## 2017-03-28 RX ADMIN — PHENYTOIN SODIUM 100 MG: 50 INJECTION INTRAMUSCULAR; INTRAVENOUS at 02:43

## 2017-03-28 RX ADMIN — PROPOFOL 30 MCG/KG/MIN: 10 INJECTION, EMULSION INTRAVENOUS at 01:15

## 2017-03-28 RX ADMIN — INSULIN LISPRO 2 UNITS: 100 INJECTION, SOLUTION INTRAVENOUS; SUBCUTANEOUS at 08:28

## 2017-03-28 RX ADMIN — METOCLOPRAMIDE 5 MG: 5 INJECTION, SOLUTION INTRAMUSCULAR; INTRAVENOUS at 23:52

## 2017-03-28 RX ADMIN — INSULIN LISPRO 2 UNITS: 100 INJECTION, SOLUTION INTRAVENOUS; SUBCUTANEOUS at 05:30

## 2017-03-28 RX ADMIN — METOCLOPRAMIDE 5 MG: 5 INJECTION, SOLUTION INTRAMUSCULAR; INTRAVENOUS at 00:06

## 2017-03-28 RX ADMIN — LORAZEPAM 2 MG: 2 INJECTION INTRAMUSCULAR; INTRAVENOUS at 21:37

## 2017-03-28 RX ADMIN — HEPARIN SODIUM 5000 UNITS: 5000 INJECTION, SOLUTION INTRAVENOUS; SUBCUTANEOUS at 13:58

## 2017-03-28 RX ADMIN — OXYCODONE HYDROCHLORIDE 10 MG: 5 TABLET ORAL at 02:26

## 2017-03-28 RX ADMIN — LORAZEPAM 4 MG: 2 TABLET ORAL at 14:48

## 2017-03-28 RX ADMIN — SENNA 8.6 MG: 8.6 TABLET, COATED ORAL at 20:14

## 2017-03-28 RX ADMIN — LORAZEPAM 4 MG: 2 TABLET ORAL at 08:47

## 2017-03-28 RX ADMIN — METOPROLOL TARTRATE 25 MG: 25 TABLET ORAL at 20:14

## 2017-03-28 RX ADMIN — PANTOPRAZOLE SODIUM 40 MG: 40 INJECTION, POWDER, FOR SOLUTION INTRAVENOUS at 08:03

## 2017-03-28 RX ADMIN — PROPOFOL 25 MCG/KG/MIN: 10 INJECTION, EMULSION INTRAVENOUS at 16:01

## 2017-03-28 RX ADMIN — HEPARIN SODIUM 5000 UNITS: 5000 INJECTION, SOLUTION INTRAVENOUS; SUBCUTANEOUS at 05:25

## 2017-03-28 RX ADMIN — PHENYTOIN SODIUM 100 MG: 50 INJECTION INTRAMUSCULAR; INTRAVENOUS at 18:17

## 2017-03-28 RX ADMIN — Medication 10 ML: at 08:26

## 2017-03-28 RX ADMIN — LORAZEPAM 2 MG: 2 INJECTION INTRAMUSCULAR; INTRAVENOUS at 12:14

## 2017-03-28 RX ADMIN — PROPOFOL 30 MCG/KG/MIN: 10 INJECTION, EMULSION INTRAVENOUS at 21:30

## 2017-03-28 RX ADMIN — LEVETIRACETAM 500 MG: 500 INJECTION, SOLUTION, CONCENTRATE INTRAVENOUS at 05:57

## 2017-03-28 RX ADMIN — FENTANYL CITRATE 100 MCG: 50 INJECTION, SOLUTION INTRAMUSCULAR; INTRAVENOUS at 16:54

## 2017-03-28 RX ADMIN — LORAZEPAM 1 MG: 1 TABLET ORAL at 04:05

## 2017-03-28 RX ADMIN — Medication 10 ML: at 08:03

## 2017-03-28 RX ADMIN — HEPARIN SODIUM 5000 UNITS: 5000 INJECTION, SOLUTION INTRAVENOUS; SUBCUTANEOUS at 21:33

## 2017-03-28 RX ADMIN — FENTANYL CITRATE 100 MCG: 50 INJECTION, SOLUTION INTRAMUSCULAR; INTRAVENOUS at 12:10

## 2017-03-28 RX ADMIN — AMIODARONE HYDROCHLORIDE 200 MG: 200 TABLET ORAL at 20:15

## 2017-03-28 RX ADMIN — INSULIN LISPRO 2 UNITS: 100 INJECTION, SOLUTION INTRAVENOUS; SUBCUTANEOUS at 13:53

## 2017-03-28 RX ADMIN — INSULIN GLARGINE 10 UNITS: 100 INJECTION, SOLUTION SUBCUTANEOUS at 20:15

## 2017-03-28 RX ADMIN — TAMSULOSIN HYDROCHLORIDE 0.4 MG: 0.4 CAPSULE ORAL at 08:03

## 2017-03-28 RX ADMIN — FENTANYL CITRATE 100 MCG: 50 INJECTION, SOLUTION INTRAMUSCULAR; INTRAVENOUS at 09:05

## 2017-03-28 RX ADMIN — SODIUM CHLORIDE: 9 INJECTION, SOLUTION INTRAVENOUS at 11:41

## 2017-03-28 RX ADMIN — FENTANYL CITRATE 100 MCG: 50 INJECTION, SOLUTION INTRAMUSCULAR; INTRAVENOUS at 05:24

## 2017-03-28 RX ADMIN — LORAZEPAM 4 MG: 2 TABLET ORAL at 20:15

## 2017-03-28 RX ADMIN — METOCLOPRAMIDE 5 MG: 5 INJECTION, SOLUTION INTRAMUSCULAR; INTRAVENOUS at 13:39

## 2017-03-28 RX ADMIN — LEVETIRACETAM 500 MG: 500 INJECTION, SOLUTION, CONCENTRATE INTRAVENOUS at 17:49

## 2017-03-28 RX ADMIN — PROPOFOL 30 MCG/KG/MIN: 10 INJECTION, EMULSION INTRAVENOUS at 07:07

## 2017-03-28 RX ADMIN — LORAZEPAM 2 MG: 2 INJECTION INTRAMUSCULAR; INTRAVENOUS at 18:45

## 2017-03-28 RX ADMIN — ASPIRIN 81 MG: 81 TABLET, CHEWABLE ORAL at 08:03

## 2017-03-28 RX ADMIN — Medication 2 G: at 02:26

## 2017-03-28 RX ADMIN — FENTANYL CITRATE 100 MCG: 50 INJECTION, SOLUTION INTRAMUSCULAR; INTRAVENOUS at 21:29

## 2017-03-28 RX ADMIN — METOCLOPRAMIDE 5 MG: 5 INJECTION, SOLUTION INTRAMUSCULAR; INTRAVENOUS at 05:57

## 2017-03-28 RX ADMIN — LORAZEPAM 1 MG: 1 TABLET ORAL at 08:06

## 2017-03-28 RX ADMIN — OXYCODONE HYDROCHLORIDE 10 MG: 5 TABLET ORAL at 20:15

## 2017-03-28 RX ADMIN — LORAZEPAM 2 MG: 2 INJECTION INTRAMUSCULAR; INTRAVENOUS at 07:59

## 2017-03-28 RX ADMIN — PHENYTOIN SODIUM 100 MG: 50 INJECTION INTRAMUSCULAR; INTRAVENOUS at 11:47

## 2017-03-28 RX ADMIN — SODIUM CHLORIDE: 9 INJECTION, SOLUTION INTRAVENOUS at 11:42

## 2017-03-28 RX ADMIN — METOCLOPRAMIDE 5 MG: 5 INJECTION, SOLUTION INTRAMUSCULAR; INTRAVENOUS at 17:48

## 2017-03-28 RX ADMIN — LORAZEPAM 1 MG: 2 INJECTION INTRAMUSCULAR; INTRAVENOUS at 02:31

## 2017-03-28 RX ADMIN — FENTANYL CITRATE 100 MCG: 50 INJECTION, SOLUTION INTRAMUSCULAR; INTRAVENOUS at 02:32

## 2017-03-28 ASSESSMENT — PULMONARY FUNCTION TESTS
PIF_VALUE: 11
PIF_VALUE: 16
PIF_VALUE: 16
PIF_VALUE: 11
PIF_VALUE: 11
PIF_VALUE: 18
PIF_VALUE: 15
PIF_VALUE: 8
PIF_VALUE: 11
PIF_VALUE: 16

## 2017-03-29 LAB
ABSOLUTE EOS #: 0.3 K/UL (ref 0–0.4)
ABSOLUTE LYMPH #: 0.9 K/UL (ref 1–4.8)
ABSOLUTE MONO #: 1.3 K/UL (ref 0.1–1.2)
ANION GAP SERPL CALCULATED.3IONS-SCNC: 11 MMOL/L (ref 9–17)
BASOPHILS # BLD: 1 % (ref 0–2)
BASOPHILS ABSOLUTE: 0.1 K/UL (ref 0–0.2)
BUN BLDV-MCNC: 11 MG/DL (ref 8–23)
BUN/CREAT BLD: ABNORMAL (ref 9–20)
CALCIUM SERPL-MCNC: 7.9 MG/DL (ref 8.6–10.4)
CHLORIDE BLD-SCNC: 105 MMOL/L (ref 98–107)
CO2: 26 MMOL/L (ref 20–31)
CREAT SERPL-MCNC: 0.5 MG/DL (ref 0.7–1.2)
DIFFERENTIAL TYPE: ABNORMAL
EOSINOPHILS RELATIVE PERCENT: 3 % (ref 1–4)
FIO2: 30
GFR AFRICAN AMERICAN: >60 ML/MIN
GFR NON-AFRICAN AMERICAN: >60 ML/MIN
GFR SERPL CREATININE-BSD FRML MDRD: ABNORMAL ML/MIN/{1.73_M2}
GFR SERPL CREATININE-BSD FRML MDRD: ABNORMAL ML/MIN/{1.73_M2}
GLUCOSE BLD-MCNC: 127 MG/DL (ref 75–110)
GLUCOSE BLD-MCNC: 130 MG/DL (ref 75–110)
GLUCOSE BLD-MCNC: 136 MG/DL (ref 75–110)
GLUCOSE BLD-MCNC: 152 MG/DL (ref 70–99)
GLUCOSE BLD-MCNC: 166 MG/DL (ref 75–110)
HCT VFR BLD CALC: 36.4 % (ref 41–53)
HEMOGLOBIN: 12.4 G/DL (ref 13.5–17.5)
LYMPHOCYTES # BLD: 8 % (ref 24–44)
MAGNESIUM: 2 MG/DL (ref 1.6–2.6)
MCH RBC QN AUTO: 30.2 PG (ref 26–34)
MCHC RBC AUTO-ENTMCNC: 34.1 G/DL (ref 31–37)
MCV RBC AUTO: 88.6 FL (ref 80–100)
MONOCYTES # BLD: 11 % (ref 2–11)
NEGATIVE BASE EXCESS, ART: ABNORMAL (ref 0–2)
O2 DEVICE/FLOW/%: ABNORMAL
PATIENT TEMP: 37.8
PDW BLD-RTO: 14.7 % (ref 12.5–15.4)
PHOSPHORUS: 2.9 MG/DL (ref 2.5–4.5)
PLATELET # BLD: 183 K/UL (ref 140–450)
PLATELET ESTIMATE: ABNORMAL
PMV BLD AUTO: 10.9 FL (ref 6–12)
POC HCO3: 25.4 MMOL/L (ref 22–27)
POC O2 SATURATION: 93 %
POC PCO2 TEMP: ABNORMAL MM HG
POC PCO2: 38 MM HG (ref 32–45)
POC PH TEMP: ABNORMAL
POC PH: 7.44 (ref 7.35–7.45)
POC PO2 TEMP: ABNORMAL MM HG
POC PO2: 64 MM HG (ref 75–95)
POSITIVE BASE EXCESS, ART: 1 (ref 0–2)
POTASSIUM SERPL-SCNC: 3.4 MMOL/L (ref 3.7–5.3)
RBC # BLD: 4.11 M/UL (ref 4.5–5.9)
RBC # BLD: ABNORMAL 10*6/UL
SEG NEUTROPHILS: 77 % (ref 36–66)
SEGMENTED NEUTROPHILS ABSOLUTE COUNT: 9.5 K/UL (ref 1.8–7.7)
SODIUM BLD-SCNC: 142 MMOL/L (ref 135–144)
TCO2 (CALC), ART: 27 MM HG (ref 23–28)
TOTAL CK: 178 U/L (ref 39–308)
WBC # BLD: 12.1 K/UL (ref 3.5–11)
WBC # BLD: ABNORMAL 10*3/UL

## 2017-03-29 PROCEDURE — G8978 MOBILITY CURRENT STATUS: HCPCS

## 2017-03-29 PROCEDURE — 82947 ASSAY GLUCOSE BLOOD QUANT: CPT

## 2017-03-29 PROCEDURE — 6370000000 HC RX 637 (ALT 250 FOR IP): Performed by: INTERNAL MEDICINE

## 2017-03-29 PROCEDURE — 6360000002 HC RX W HCPCS: Performed by: INTERNAL MEDICINE

## 2017-03-29 PROCEDURE — 94770 HC ETCO2 MONITOR DAILY: CPT

## 2017-03-29 PROCEDURE — 2500000003 HC RX 250 WO HCPCS: Performed by: EMERGENCY MEDICINE

## 2017-03-29 PROCEDURE — 36415 COLL VENOUS BLD VENIPUNCTURE: CPT

## 2017-03-29 PROCEDURE — 97110 THERAPEUTIC EXERCISES: CPT

## 2017-03-29 PROCEDURE — G8979 MOBILITY GOAL STATUS: HCPCS

## 2017-03-29 PROCEDURE — 84100 ASSAY OF PHOSPHORUS: CPT

## 2017-03-29 PROCEDURE — 94762 N-INVAS EAR/PLS OXIMTRY CONT: CPT

## 2017-03-29 PROCEDURE — 82550 ASSAY OF CK (CPK): CPT

## 2017-03-29 PROCEDURE — 83735 ASSAY OF MAGNESIUM: CPT

## 2017-03-29 PROCEDURE — 2000000000 HC ICU R&B

## 2017-03-29 PROCEDURE — 2580000003 HC RX 258: Performed by: EMERGENCY MEDICINE

## 2017-03-29 PROCEDURE — 2580000003 HC RX 258: Performed by: INTERNAL MEDICINE

## 2017-03-29 PROCEDURE — 2580000003 HC RX 258: Performed by: PSYCHIATRY & NEUROLOGY

## 2017-03-29 PROCEDURE — 85025 COMPLETE CBC W/AUTO DIFF WBC: CPT

## 2017-03-29 PROCEDURE — 36600 WITHDRAWAL OF ARTERIAL BLOOD: CPT

## 2017-03-29 PROCEDURE — 6360000002 HC RX W HCPCS: Performed by: EMERGENCY MEDICINE

## 2017-03-29 PROCEDURE — 99291 CRITICAL CARE FIRST HOUR: CPT | Performed by: INTERNAL MEDICINE

## 2017-03-29 PROCEDURE — 6370000000 HC RX 637 (ALT 250 FOR IP): Performed by: HOSPITALIST

## 2017-03-29 PROCEDURE — 82803 BLOOD GASES ANY COMBINATION: CPT

## 2017-03-29 PROCEDURE — 6360000002 HC RX W HCPCS: Performed by: HOSPITALIST

## 2017-03-29 PROCEDURE — 6360000002 HC RX W HCPCS: Performed by: PSYCHIATRY & NEUROLOGY

## 2017-03-29 PROCEDURE — 97162 PT EVAL MOD COMPLEX 30 MIN: CPT

## 2017-03-29 PROCEDURE — 80048 BASIC METABOLIC PNL TOTAL CA: CPT

## 2017-03-29 PROCEDURE — 6370000000 HC RX 637 (ALT 250 FOR IP): Performed by: EMERGENCY MEDICINE

## 2017-03-29 PROCEDURE — 94003 VENT MGMT INPAT SUBQ DAY: CPT

## 2017-03-29 RX ORDER — METOPROLOL TARTRATE 50 MG/1
50 TABLET, FILM COATED ORAL 2 TIMES DAILY
Status: DISCONTINUED | OUTPATIENT
Start: 2017-03-29 | End: 2017-04-02

## 2017-03-29 RX ORDER — LORAZEPAM 2 MG/1
4 TABLET ORAL EVERY 4 HOURS
Status: DISCONTINUED | OUTPATIENT
Start: 2017-03-29 | End: 2017-04-02

## 2017-03-29 RX ORDER — METOCLOPRAMIDE HYDROCHLORIDE 5 MG/ML
10 INJECTION INTRAMUSCULAR; INTRAVENOUS EVERY 6 HOURS
Status: DISCONTINUED | OUTPATIENT
Start: 2017-03-29 | End: 2017-04-02

## 2017-03-29 RX ADMIN — Medication 2 G: at 18:15

## 2017-03-29 RX ADMIN — Medication 10 ML: at 08:31

## 2017-03-29 RX ADMIN — Medication 2 G: at 10:59

## 2017-03-29 RX ADMIN — ACETAMINOPHEN 650 MG: 325 TABLET ORAL at 08:29

## 2017-03-29 RX ADMIN — HEPARIN SODIUM 5000 UNITS: 5000 INJECTION, SOLUTION INTRAVENOUS; SUBCUTANEOUS at 14:18

## 2017-03-29 RX ADMIN — POTASSIUM CHLORIDE 10 MEQ: 10 INJECTION, SOLUTION INTRAVENOUS at 07:33

## 2017-03-29 RX ADMIN — FENTANYL CITRATE 100 MCG: 50 INJECTION, SOLUTION INTRAMUSCULAR; INTRAVENOUS at 03:00

## 2017-03-29 RX ADMIN — FENTANYL CITRATE 100 MCG: 50 INJECTION, SOLUTION INTRAMUSCULAR; INTRAVENOUS at 12:53

## 2017-03-29 RX ADMIN — METOPROLOL TARTRATE 5 MG: 5 INJECTION INTRAVENOUS at 02:26

## 2017-03-29 RX ADMIN — Medication 2 G: at 02:39

## 2017-03-29 RX ADMIN — LANSOPRAZOLE 30 MG: 30 TABLET, ORALLY DISINTEGRATING, DELAYED RELEASE ORAL at 06:10

## 2017-03-29 RX ADMIN — PROPOFOL 15 MCG/KG/MIN: 10 INJECTION, EMULSION INTRAVENOUS at 17:57

## 2017-03-29 RX ADMIN — PHENYTOIN SODIUM 100 MG: 50 INJECTION INTRAMUSCULAR; INTRAVENOUS at 02:37

## 2017-03-29 RX ADMIN — FENTANYL CITRATE 100 MCG: 50 INJECTION, SOLUTION INTRAMUSCULAR; INTRAVENOUS at 17:58

## 2017-03-29 RX ADMIN — METOPROLOL TARTRATE 50 MG: 50 TABLET, FILM COATED ORAL at 21:07

## 2017-03-29 RX ADMIN — AMIODARONE HYDROCHLORIDE 200 MG: 200 TABLET ORAL at 08:30

## 2017-03-29 RX ADMIN — OXYCODONE HYDROCHLORIDE 10 MG: 5 TABLET ORAL at 08:29

## 2017-03-29 RX ADMIN — METOCLOPRAMIDE 5 MG: 5 INJECTION, SOLUTION INTRAMUSCULAR; INTRAVENOUS at 12:56

## 2017-03-29 RX ADMIN — OXYCODONE HYDROCHLORIDE 10 MG: 5 TABLET ORAL at 02:36

## 2017-03-29 RX ADMIN — LEVETIRACETAM 500 MG: 500 INJECTION, SOLUTION, CONCENTRATE INTRAVENOUS at 06:10

## 2017-03-29 RX ADMIN — LORAZEPAM 4 MG: 2 TABLET ORAL at 21:15

## 2017-03-29 RX ADMIN — POTASSIUM CHLORIDE 10 MEQ: 10 INJECTION, SOLUTION INTRAVENOUS at 08:34

## 2017-03-29 RX ADMIN — SODIUM CHLORIDE: 9 INJECTION, SOLUTION INTRAVENOUS at 14:17

## 2017-03-29 RX ADMIN — HEPARIN SODIUM 5000 UNITS: 5000 INJECTION, SOLUTION INTRAVENOUS; SUBCUTANEOUS at 05:21

## 2017-03-29 RX ADMIN — LORAZEPAM 2 MG: 2 INJECTION INTRAMUSCULAR; INTRAVENOUS at 00:39

## 2017-03-29 RX ADMIN — INSULIN GLARGINE 10 UNITS: 100 INJECTION, SOLUTION SUBCUTANEOUS at 21:06

## 2017-03-29 RX ADMIN — LORAZEPAM 4 MG: 2 TABLET ORAL at 02:37

## 2017-03-29 RX ADMIN — OXYCODONE HYDROCHLORIDE 10 MG: 5 TABLET ORAL at 14:17

## 2017-03-29 RX ADMIN — SENNA 8.6 MG: 8.6 TABLET, COATED ORAL at 21:07

## 2017-03-29 RX ADMIN — OXYCODONE HYDROCHLORIDE 10 MG: 5 TABLET ORAL at 20:53

## 2017-03-29 RX ADMIN — HEPARIN SODIUM 5000 UNITS: 5000 INJECTION, SOLUTION INTRAVENOUS; SUBCUTANEOUS at 21:07

## 2017-03-29 RX ADMIN — ASPIRIN 81 MG: 81 TABLET, CHEWABLE ORAL at 08:30

## 2017-03-29 RX ADMIN — LORAZEPAM 4 MG: 2 TABLET ORAL at 08:28

## 2017-03-29 RX ADMIN — PHENYTOIN SODIUM 100 MG: 50 INJECTION INTRAMUSCULAR; INTRAVENOUS at 18:39

## 2017-03-29 RX ADMIN — AMIODARONE HYDROCHLORIDE 200 MG: 200 TABLET ORAL at 21:07

## 2017-03-29 RX ADMIN — TAMSULOSIN HYDROCHLORIDE 0.4 MG: 0.4 CAPSULE ORAL at 08:30

## 2017-03-29 RX ADMIN — INSULIN LISPRO 2 UNITS: 100 INJECTION, SOLUTION INTRAVENOUS; SUBCUTANEOUS at 21:07

## 2017-03-29 RX ADMIN — METOCLOPRAMIDE 5 MG: 5 INJECTION, SOLUTION INTRAMUSCULAR; INTRAVENOUS at 06:10

## 2017-03-29 RX ADMIN — PROPOFOL 15 MCG/KG/MIN: 10 INJECTION, EMULSION INTRAVENOUS at 14:17

## 2017-03-29 RX ADMIN — FENTANYL CITRATE 100 MCG: 50 INJECTION, SOLUTION INTRAMUSCULAR; INTRAVENOUS at 06:37

## 2017-03-29 RX ADMIN — DOCUSATE SODIUM 100 MG: 50 LIQUID ORAL at 17:58

## 2017-03-29 RX ADMIN — PHENYTOIN SODIUM 100 MG: 50 INJECTION INTRAMUSCULAR; INTRAVENOUS at 11:26

## 2017-03-29 RX ADMIN — POTASSIUM CHLORIDE 10 MEQ: 10 INJECTION, SOLUTION INTRAVENOUS at 10:14

## 2017-03-29 RX ADMIN — FENTANYL CITRATE 100 MCG: 50 INJECTION, SOLUTION INTRAMUSCULAR; INTRAVENOUS at 01:07

## 2017-03-29 RX ADMIN — LORAZEPAM 4 MG: 2 TABLET ORAL at 17:57

## 2017-03-29 RX ADMIN — LORAZEPAM 4 MG: 2 TABLET ORAL at 14:18

## 2017-03-29 RX ADMIN — METOCLOPRAMIDE 10 MG: 5 INJECTION, SOLUTION INTRAMUSCULAR; INTRAVENOUS at 18:00

## 2017-03-29 RX ADMIN — METOPROLOL TARTRATE 25 MG: 25 TABLET ORAL at 10:14

## 2017-03-29 RX ADMIN — LEVETIRACETAM 500 MG: 500 INJECTION, SOLUTION, CONCENTRATE INTRAVENOUS at 17:58

## 2017-03-29 ASSESSMENT — PULMONARY FUNCTION TESTS
PIF_VALUE: 16
PIF_VALUE: 19
PIF_VALUE: 17
PIF_VALUE: 19
PIF_VALUE: 15
PIF_VALUE: 20

## 2017-03-30 LAB
ABSOLUTE EOS #: 0.45 K/UL (ref 0–0.4)
ABSOLUTE LYMPH #: 1.35 K/UL (ref 1–4.8)
ABSOLUTE MONO #: 2.25 K/UL (ref 0.1–0.8)
ANION GAP SERPL CALCULATED.3IONS-SCNC: 13 MMOL/L (ref 9–17)
BASOPHILS # BLD: 1 % (ref 0–2)
BASOPHILS ABSOLUTE: 0.15 K/UL (ref 0–0.2)
BUN BLDV-MCNC: 9 MG/DL (ref 8–23)
BUN/CREAT BLD: ABNORMAL (ref 9–20)
CALCIUM SERPL-MCNC: 8.4 MG/DL (ref 8.6–10.4)
CHLORIDE BLD-SCNC: 100 MMOL/L (ref 98–107)
CO2: 23 MMOL/L (ref 20–31)
CREAT SERPL-MCNC: 0.52 MG/DL (ref 0.7–1.2)
DIFFERENTIAL TYPE: ABNORMAL
EOSINOPHILS RELATIVE PERCENT: 3 % (ref 1–4)
FIO2: 35
GFR AFRICAN AMERICAN: >60 ML/MIN
GFR NON-AFRICAN AMERICAN: >60 ML/MIN
GFR SERPL CREATININE-BSD FRML MDRD: ABNORMAL ML/MIN/{1.73_M2}
GFR SERPL CREATININE-BSD FRML MDRD: ABNORMAL ML/MIN/{1.73_M2}
GLUCOSE BLD-MCNC: 123 MG/DL (ref 75–110)
GLUCOSE BLD-MCNC: 134 MG/DL (ref 75–110)
GLUCOSE BLD-MCNC: 154 MG/DL (ref 70–99)
GLUCOSE BLD-MCNC: 196 MG/DL (ref 75–110)
GLUCOSE BLD-MCNC: 205 MG/DL (ref 75–110)
HCT VFR BLD CALC: 42.3 % (ref 41–53)
HEMOGLOBIN: 14.3 G/DL (ref 13.5–17.5)
LYMPHOCYTES # BLD: 9 % (ref 24–44)
MAGNESIUM: 2 MG/DL (ref 1.6–2.6)
MCH RBC QN AUTO: 29.8 PG (ref 26–34)
MCHC RBC AUTO-ENTMCNC: 33.7 G/DL (ref 31–37)
MCV RBC AUTO: 88.5 FL (ref 80–100)
MONOCYTES # BLD: 15 % (ref 1–7)
MORPHOLOGY: NORMAL
NEGATIVE BASE EXCESS, ART: ABNORMAL (ref 0–2)
O2 DEVICE/FLOW/%: ABNORMAL
PATIENT TEMP: 38
PDW BLD-RTO: 15 % (ref 12.5–15.4)
PHOSPHORUS: 2.2 MG/DL (ref 2.5–4.5)
PLATELET # BLD: 237 K/UL (ref 140–450)
PLATELET ESTIMATE: ABNORMAL
PMV BLD AUTO: 10.3 FL (ref 6–12)
POC HCO3: 24.4 MMOL/L (ref 22–27)
POC O2 SATURATION: 97 %
POC PCO2 TEMP: ABNORMAL MM HG
POC PCO2: 32 MM HG (ref 32–45)
POC PH TEMP: ABNORMAL
POC PH: 7.5 (ref 7.35–7.45)
POC PO2 TEMP: ABNORMAL MM HG
POC PO2: 83 MM HG (ref 75–95)
POSITIVE BASE EXCESS, ART: 1 (ref 0–2)
POTASSIUM SERPL-SCNC: 3.3 MMOL/L (ref 3.7–5.3)
RBC # BLD: 4.78 M/UL (ref 4.5–5.9)
RBC # BLD: ABNORMAL 10*6/UL
SEG NEUTROPHILS: 72 % (ref 36–66)
SEGMENTED NEUTROPHILS ABSOLUTE COUNT: 10.8 K/UL (ref 1.8–7.7)
SODIUM BLD-SCNC: 136 MMOL/L (ref 135–144)
TCO2 (CALC), ART: 25 MM HG (ref 23–28)
TOTAL CK: 220 U/L (ref 39–308)
WBC # BLD: 15 K/UL (ref 3.5–11)
WBC # BLD: ABNORMAL 10*3/UL

## 2017-03-30 PROCEDURE — 2580000003 HC RX 258: Performed by: PSYCHIATRY & NEUROLOGY

## 2017-03-30 PROCEDURE — 6370000000 HC RX 637 (ALT 250 FOR IP): Performed by: INTERNAL MEDICINE

## 2017-03-30 PROCEDURE — 6360000002 HC RX W HCPCS: Performed by: PSYCHIATRY & NEUROLOGY

## 2017-03-30 PROCEDURE — 6370000000 HC RX 637 (ALT 250 FOR IP): Performed by: HOSPITALIST

## 2017-03-30 PROCEDURE — 36600 WITHDRAWAL OF ARTERIAL BLOOD: CPT

## 2017-03-30 PROCEDURE — 6360000002 HC RX W HCPCS: Performed by: INTERNAL MEDICINE

## 2017-03-30 PROCEDURE — 80048 BASIC METABOLIC PNL TOTAL CA: CPT

## 2017-03-30 PROCEDURE — 84100 ASSAY OF PHOSPHORUS: CPT

## 2017-03-30 PROCEDURE — 82803 BLOOD GASES ANY COMBINATION: CPT

## 2017-03-30 PROCEDURE — 83735 ASSAY OF MAGNESIUM: CPT

## 2017-03-30 PROCEDURE — 87641 MR-STAPH DNA AMP PROBE: CPT

## 2017-03-30 PROCEDURE — 99291 CRITICAL CARE FIRST HOUR: CPT | Performed by: INTERNAL MEDICINE

## 2017-03-30 PROCEDURE — 2580000003 HC RX 258: Performed by: EMERGENCY MEDICINE

## 2017-03-30 PROCEDURE — 97110 THERAPEUTIC EXERCISES: CPT

## 2017-03-30 PROCEDURE — 36415 COLL VENOUS BLD VENIPUNCTURE: CPT

## 2017-03-30 PROCEDURE — 2000000000 HC ICU R&B

## 2017-03-30 PROCEDURE — 82947 ASSAY GLUCOSE BLOOD QUANT: CPT

## 2017-03-30 PROCEDURE — 85025 COMPLETE CBC W/AUTO DIFF WBC: CPT

## 2017-03-30 PROCEDURE — 2580000003 HC RX 258: Performed by: INTERNAL MEDICINE

## 2017-03-30 PROCEDURE — 94762 N-INVAS EAR/PLS OXIMTRY CONT: CPT

## 2017-03-30 PROCEDURE — 6360000002 HC RX W HCPCS: Performed by: EMERGENCY MEDICINE

## 2017-03-30 PROCEDURE — 82550 ASSAY OF CK (CPK): CPT

## 2017-03-30 PROCEDURE — 6370000000 HC RX 637 (ALT 250 FOR IP): Performed by: EMERGENCY MEDICINE

## 2017-03-30 RX ORDER — POTASSIUM CHLORIDE 20 MEQ/1
40 TABLET, EXTENDED RELEASE ORAL PRN
Status: DISCONTINUED | OUTPATIENT
Start: 2017-03-30 | End: 2017-04-05 | Stop reason: HOSPADM

## 2017-03-30 RX ORDER — POTASSIUM CHLORIDE 20MEQ/15ML
40 LIQUID (ML) ORAL PRN
Status: DISCONTINUED | OUTPATIENT
Start: 2017-03-30 | End: 2017-04-05 | Stop reason: HOSPADM

## 2017-03-30 RX ORDER — OXYCODONE HYDROCHLORIDE 5 MG/1
10 TABLET ORAL EVERY 4 HOURS
Status: DISCONTINUED | OUTPATIENT
Start: 2017-03-30 | End: 2017-04-02

## 2017-03-30 RX ORDER — HYDRALAZINE HYDROCHLORIDE 20 MG/ML
10 INJECTION INTRAMUSCULAR; INTRAVENOUS EVERY 6 HOURS PRN
Status: DISCONTINUED | OUTPATIENT
Start: 2017-03-30 | End: 2017-04-05 | Stop reason: HOSPADM

## 2017-03-30 RX ORDER — BISACODYL 10 MG
10 SUPPOSITORY, RECTAL RECTAL DAILY PRN
Status: DISCONTINUED | OUTPATIENT
Start: 2017-03-30 | End: 2017-04-05 | Stop reason: HOSPADM

## 2017-03-30 RX ORDER — POTASSIUM CHLORIDE 7.45 MG/ML
10 INJECTION INTRAVENOUS PRN
Status: DISCONTINUED | OUTPATIENT
Start: 2017-03-30 | End: 2017-04-05 | Stop reason: HOSPADM

## 2017-03-30 RX ADMIN — TAMSULOSIN HYDROCHLORIDE 0.4 MG: 0.4 CAPSULE ORAL at 08:06

## 2017-03-30 RX ADMIN — INSULIN GLARGINE 10 UNITS: 100 INJECTION, SOLUTION SUBCUTANEOUS at 21:00

## 2017-03-30 RX ADMIN — ASPIRIN 81 MG: 81 TABLET, CHEWABLE ORAL at 08:06

## 2017-03-30 RX ADMIN — LANSOPRAZOLE 30 MG: 30 TABLET, ORALLY DISINTEGRATING, DELAYED RELEASE ORAL at 05:45

## 2017-03-30 RX ADMIN — LEVETIRACETAM 500 MG: 500 INJECTION, SOLUTION, CONCENTRATE INTRAVENOUS at 05:44

## 2017-03-30 RX ADMIN — LORAZEPAM 4 MG: 2 TABLET ORAL at 18:54

## 2017-03-30 RX ADMIN — FENTANYL CITRATE 100 MCG: 50 INJECTION, SOLUTION INTRAMUSCULAR; INTRAVENOUS at 17:31

## 2017-03-30 RX ADMIN — AMIODARONE HYDROCHLORIDE 200 MG: 200 TABLET ORAL at 20:51

## 2017-03-30 RX ADMIN — LORAZEPAM 4 MG: 2 TABLET ORAL at 02:43

## 2017-03-30 RX ADMIN — SENNA 8.6 MG: 8.6 TABLET, COATED ORAL at 20:51

## 2017-03-30 RX ADMIN — HEPARIN SODIUM 5000 UNITS: 5000 INJECTION, SOLUTION INTRAVENOUS; SUBCUTANEOUS at 22:18

## 2017-03-30 RX ADMIN — FENTANYL CITRATE 100 MCG: 50 INJECTION, SOLUTION INTRAMUSCULAR; INTRAVENOUS at 12:03

## 2017-03-30 RX ADMIN — FENTANYL CITRATE 100 MCG: 50 INJECTION, SOLUTION INTRAMUSCULAR; INTRAVENOUS at 23:03

## 2017-03-30 RX ADMIN — PHENYTOIN SODIUM 100 MG: 50 INJECTION INTRAMUSCULAR; INTRAVENOUS at 02:43

## 2017-03-30 RX ADMIN — BISACODYL 10 MG: 10 SUPPOSITORY RECTAL at 09:52

## 2017-03-30 RX ADMIN — HEPARIN SODIUM 5000 UNITS: 5000 INJECTION, SOLUTION INTRAVENOUS; SUBCUTANEOUS at 05:45

## 2017-03-30 RX ADMIN — METOCLOPRAMIDE 10 MG: 5 INJECTION, SOLUTION INTRAMUSCULAR; INTRAVENOUS at 11:52

## 2017-03-30 RX ADMIN — INSULIN LISPRO 2 UNITS: 100 INJECTION, SOLUTION INTRAVENOUS; SUBCUTANEOUS at 21:02

## 2017-03-30 RX ADMIN — LORAZEPAM 4 MG: 2 TABLET ORAL at 22:48

## 2017-03-30 RX ADMIN — PROPOFOL 15 MCG/KG/MIN: 10 INJECTION, EMULSION INTRAVENOUS at 11:51

## 2017-03-30 RX ADMIN — OXYCODONE HYDROCHLORIDE 10 MG: 5 TABLET ORAL at 08:10

## 2017-03-30 RX ADMIN — PHENYTOIN SODIUM 100 MG: 50 INJECTION INTRAMUSCULAR; INTRAVENOUS at 18:49

## 2017-03-30 RX ADMIN — OXYCODONE HYDROCHLORIDE 10 MG: 5 TABLET ORAL at 23:37

## 2017-03-30 RX ADMIN — METOPROLOL TARTRATE 50 MG: 50 TABLET, FILM COATED ORAL at 20:51

## 2017-03-30 RX ADMIN — Medication 2 G: at 10:33

## 2017-03-30 RX ADMIN — AMIODARONE HYDROCHLORIDE 200 MG: 200 TABLET ORAL at 08:06

## 2017-03-30 RX ADMIN — HEPARIN SODIUM 5000 UNITS: 5000 INJECTION, SOLUTION INTRAVENOUS; SUBCUTANEOUS at 13:33

## 2017-03-30 RX ADMIN — OXYCODONE HYDROCHLORIDE 10 MG: 5 TABLET ORAL at 02:42

## 2017-03-30 RX ADMIN — PHENYTOIN SODIUM 100 MG: 50 INJECTION INTRAMUSCULAR; INTRAVENOUS at 10:34

## 2017-03-30 RX ADMIN — Medication 2 G: at 18:54

## 2017-03-30 RX ADMIN — PROPOFOL 20 MCG/KG/MIN: 10 INJECTION, EMULSION INTRAVENOUS at 03:00

## 2017-03-30 RX ADMIN — Medication 2 G: at 02:43

## 2017-03-30 RX ADMIN — OXYCODONE HYDROCHLORIDE 10 MG: 5 TABLET ORAL at 20:00

## 2017-03-30 RX ADMIN — OXYCODONE HYDROCHLORIDE 10 MG: 5 TABLET ORAL at 15:42

## 2017-03-30 RX ADMIN — POTASSIUM CHLORIDE 20 MEQ: 29.8 INJECTION, SOLUTION INTRAVENOUS at 07:29

## 2017-03-30 RX ADMIN — METOCLOPRAMIDE 10 MG: 5 INJECTION, SOLUTION INTRAMUSCULAR; INTRAVENOUS at 05:45

## 2017-03-30 RX ADMIN — LORAZEPAM 4 MG: 2 TABLET ORAL at 10:33

## 2017-03-30 RX ADMIN — OXYCODONE HYDROCHLORIDE 10 MG: 5 TABLET ORAL at 11:52

## 2017-03-30 RX ADMIN — METOPROLOL TARTRATE 50 MG: 50 TABLET, FILM COATED ORAL at 08:06

## 2017-03-30 RX ADMIN — Medication 10 ML: at 07:32

## 2017-03-30 RX ADMIN — FENTANYL CITRATE 100 MCG: 50 INJECTION, SOLUTION INTRAMUSCULAR; INTRAVENOUS at 06:07

## 2017-03-30 RX ADMIN — INSULIN LISPRO 4 UNITS: 100 INJECTION, SOLUTION INTRAVENOUS; SUBCUTANEOUS at 13:33

## 2017-03-30 RX ADMIN — FENTANYL CITRATE 100 MCG: 50 INJECTION, SOLUTION INTRAMUSCULAR; INTRAVENOUS at 20:49

## 2017-03-30 RX ADMIN — LORAZEPAM 4 MG: 2 TABLET ORAL at 05:45

## 2017-03-30 RX ADMIN — SODIUM CHLORIDE: 9 INJECTION, SOLUTION INTRAVENOUS at 03:00

## 2017-03-30 RX ADMIN — METOCLOPRAMIDE 10 MG: 5 INJECTION, SOLUTION INTRAMUSCULAR; INTRAVENOUS at 00:34

## 2017-03-30 RX ADMIN — POTASSIUM CHLORIDE 20 MEQ: 29.8 INJECTION, SOLUTION INTRAVENOUS at 08:24

## 2017-03-30 RX ADMIN — SODIUM CHLORIDE: 9 INJECTION, SOLUTION INTRAVENOUS at 03:16

## 2017-03-30 RX ADMIN — LEVETIRACETAM 500 MG: 500 INJECTION, SOLUTION, CONCENTRATE INTRAVENOUS at 18:47

## 2017-03-30 RX ADMIN — Medication 10 ML: at 08:06

## 2017-03-30 RX ADMIN — HYDRALAZINE HYDROCHLORIDE 10 MG: 20 INJECTION INTRAMUSCULAR; INTRAVENOUS at 02:44

## 2017-03-30 RX ADMIN — FENTANYL CITRATE 100 MCG: 50 INJECTION, SOLUTION INTRAMUSCULAR; INTRAVENOUS at 04:10

## 2017-03-30 RX ADMIN — LORAZEPAM 4 MG: 2 TABLET ORAL at 14:24

## 2017-03-30 RX ADMIN — DOCUSATE SODIUM 100 MG: 50 LIQUID ORAL at 08:06

## 2017-03-30 ASSESSMENT — PULMONARY FUNCTION TESTS
PIF_VALUE: 16
PIF_VALUE: 17
PIF_VALUE: 12
PIF_VALUE: 13
PIF_VALUE: 7
PIF_VALUE: 12
PIF_VALUE: 14
PIF_VALUE: 16
PIF_VALUE: 18
PIF_VALUE: 14
PIF_VALUE: 13

## 2017-03-31 LAB
ABSOLUTE EOS #: 0.24 K/UL (ref 0–0.4)
ABSOLUTE LYMPH #: 0.97 K/UL (ref 1–4.8)
ABSOLUTE MONO #: 1.33 K/UL (ref 0.1–1.2)
ANION GAP SERPL CALCULATED.3IONS-SCNC: 12 MMOL/L (ref 9–17)
BASOPHILS # BLD: 1 % (ref 0–2)
BASOPHILS ABSOLUTE: 0.12 K/UL (ref 0–0.2)
BUN BLDV-MCNC: 10 MG/DL (ref 8–23)
BUN/CREAT BLD: ABNORMAL (ref 9–20)
CALCIUM SERPL-MCNC: 7.7 MG/DL (ref 8.6–10.4)
CHLORIDE BLD-SCNC: 98 MMOL/L (ref 98–107)
CO2: 23 MMOL/L (ref 20–31)
CREAT SERPL-MCNC: 0.49 MG/DL (ref 0.7–1.2)
DIFFERENTIAL TYPE: ABNORMAL
EOSINOPHILS RELATIVE PERCENT: 2 % (ref 1–4)
FIO2: 30
GFR AFRICAN AMERICAN: >60 ML/MIN
GFR NON-AFRICAN AMERICAN: >60 ML/MIN
GFR SERPL CREATININE-BSD FRML MDRD: ABNORMAL ML/MIN/{1.73_M2}
GFR SERPL CREATININE-BSD FRML MDRD: ABNORMAL ML/MIN/{1.73_M2}
GLUCOSE BLD-MCNC: 186 MG/DL (ref 75–110)
GLUCOSE BLD-MCNC: 214 MG/DL (ref 70–99)
GLUCOSE BLD-MCNC: 226 MG/DL (ref 75–110)
GLUCOSE BLD-MCNC: 249 MG/DL (ref 75–110)
GLUCOSE BLD-MCNC: 283 MG/DL (ref 75–110)
HCT VFR BLD CALC: 38.7 % (ref 41–53)
HEMOGLOBIN: 13 G/DL (ref 13.5–17.5)
LYMPHOCYTES # BLD: 8 % (ref 24–44)
MAGNESIUM: 2.1 MG/DL (ref 1.6–2.6)
MCH RBC QN AUTO: 30.3 PG (ref 26–34)
MCHC RBC AUTO-ENTMCNC: 33.5 G/DL (ref 31–37)
MCV RBC AUTO: 90.2 FL (ref 80–100)
MONOCYTES # BLD: 11 % (ref 2–11)
MORPHOLOGY: NORMAL
NEGATIVE BASE EXCESS, ART: ABNORMAL (ref 0–2)
O2 DEVICE/FLOW/%: ABNORMAL
PATIENT TEMP: 37.7
PDW BLD-RTO: 14.9 % (ref 12.5–15.4)
PHOSPHORUS: 2.3 MG/DL (ref 2.5–4.5)
PLATELET # BLD: 240 K/UL (ref 140–450)
PLATELET ESTIMATE: ABNORMAL
PMV BLD AUTO: 10.6 FL (ref 6–12)
POC HCO3: 26.9 MMOL/L (ref 22–27)
POC O2 SATURATION: 92 %
POC PCO2 TEMP: ABNORMAL MM HG
POC PCO2: 40 MM HG (ref 32–45)
POC PH TEMP: ABNORMAL
POC PH: 7.43 (ref 7.35–7.45)
POC PO2 TEMP: ABNORMAL MM HG
POC PO2: 62 MM HG (ref 75–95)
POSITIVE BASE EXCESS, ART: 3 (ref 0–2)
POTASSIUM SERPL-SCNC: 3.4 MMOL/L (ref 3.7–5.3)
RBC # BLD: 4.29 M/UL (ref 4.5–5.9)
RBC # BLD: ABNORMAL 10*6/UL
SEG NEUTROPHILS: 78 % (ref 36–66)
SEGMENTED NEUTROPHILS ABSOLUTE COUNT: 9.44 K/UL (ref 1.8–7.7)
SODIUM BLD-SCNC: 133 MMOL/L (ref 135–144)
TCO2 (CALC), ART: 28 MM HG (ref 23–28)
WBC # BLD: 12.1 K/UL (ref 3.5–11)
WBC # BLD: ABNORMAL 10*3/UL

## 2017-03-31 PROCEDURE — 6370000000 HC RX 637 (ALT 250 FOR IP): Performed by: HOSPITALIST

## 2017-03-31 PROCEDURE — 6370000000 HC RX 637 (ALT 250 FOR IP): Performed by: INTERNAL MEDICINE

## 2017-03-31 PROCEDURE — 82947 ASSAY GLUCOSE BLOOD QUANT: CPT

## 2017-03-31 PROCEDURE — 2580000003 HC RX 258: Performed by: INTERNAL MEDICINE

## 2017-03-31 PROCEDURE — 6360000002 HC RX W HCPCS: Performed by: EMERGENCY MEDICINE

## 2017-03-31 PROCEDURE — 82803 BLOOD GASES ANY COMBINATION: CPT

## 2017-03-31 PROCEDURE — 6360000002 HC RX W HCPCS: Performed by: PSYCHIATRY & NEUROLOGY

## 2017-03-31 PROCEDURE — 97110 THERAPEUTIC EXERCISES: CPT

## 2017-03-31 PROCEDURE — 84100 ASSAY OF PHOSPHORUS: CPT

## 2017-03-31 PROCEDURE — 6360000002 HC RX W HCPCS: Performed by: INTERNAL MEDICINE

## 2017-03-31 PROCEDURE — 94003 VENT MGMT INPAT SUBQ DAY: CPT

## 2017-03-31 PROCEDURE — 2500000003 HC RX 250 WO HCPCS: Performed by: INTERNAL MEDICINE

## 2017-03-31 PROCEDURE — 2580000003 HC RX 258: Performed by: EMERGENCY MEDICINE

## 2017-03-31 PROCEDURE — 36415 COLL VENOUS BLD VENIPUNCTURE: CPT

## 2017-03-31 PROCEDURE — 94762 N-INVAS EAR/PLS OXIMTRY CONT: CPT

## 2017-03-31 PROCEDURE — 85025 COMPLETE CBC W/AUTO DIFF WBC: CPT

## 2017-03-31 PROCEDURE — 83735 ASSAY OF MAGNESIUM: CPT

## 2017-03-31 PROCEDURE — 99291 CRITICAL CARE FIRST HOUR: CPT | Performed by: INTERNAL MEDICINE

## 2017-03-31 PROCEDURE — 94770 HC ETCO2 MONITOR DAILY: CPT

## 2017-03-31 PROCEDURE — 2000000000 HC ICU R&B

## 2017-03-31 PROCEDURE — 80048 BASIC METABOLIC PNL TOTAL CA: CPT

## 2017-03-31 PROCEDURE — 2580000003 HC RX 258: Performed by: PSYCHIATRY & NEUROLOGY

## 2017-03-31 RX ORDER — INSULIN GLARGINE 100 [IU]/ML
15 INJECTION, SOLUTION SUBCUTANEOUS NIGHTLY
Status: DISCONTINUED | OUTPATIENT
Start: 2017-03-31 | End: 2017-04-01

## 2017-03-31 RX ADMIN — METOPROLOL TARTRATE 50 MG: 50 TABLET, FILM COATED ORAL at 08:16

## 2017-03-31 RX ADMIN — METOCLOPRAMIDE 10 MG: 5 INJECTION, SOLUTION INTRAMUSCULAR; INTRAVENOUS at 07:10

## 2017-03-31 RX ADMIN — OXYCODONE HYDROCHLORIDE 10 MG: 5 TABLET ORAL at 12:01

## 2017-03-31 RX ADMIN — Medication 2 G: at 11:40

## 2017-03-31 RX ADMIN — PROPOFOL 20 MCG/KG/MIN: 10 INJECTION, EMULSION INTRAVENOUS at 02:26

## 2017-03-31 RX ADMIN — SODIUM PHOSPHATE, MONOBASIC, MONOHYDRATE AND SODIUM PHOSPHATE, DIBASIC, ANHYDROUS 15 MMOL: 276; 142 INJECTION, SOLUTION INTRAVENOUS at 08:43

## 2017-03-31 RX ADMIN — OXYCODONE HYDROCHLORIDE 10 MG: 5 TABLET ORAL at 07:41

## 2017-03-31 RX ADMIN — LANSOPRAZOLE 30 MG: 30 TABLET, ORALLY DISINTEGRATING, DELAYED RELEASE ORAL at 05:19

## 2017-03-31 RX ADMIN — AMIODARONE HYDROCHLORIDE 200 MG: 200 TABLET ORAL at 08:14

## 2017-03-31 RX ADMIN — LORAZEPAM 4 MG: 2 TABLET ORAL at 17:57

## 2017-03-31 RX ADMIN — OXYCODONE HYDROCHLORIDE 10 MG: 5 TABLET ORAL at 23:47

## 2017-03-31 RX ADMIN — LORAZEPAM 4 MG: 2 TABLET ORAL at 02:26

## 2017-03-31 RX ADMIN — METOCLOPRAMIDE 10 MG: 5 INJECTION, SOLUTION INTRAMUSCULAR; INTRAVENOUS at 23:47

## 2017-03-31 RX ADMIN — LORAZEPAM 4 MG: 2 TABLET ORAL at 11:34

## 2017-03-31 RX ADMIN — SODIUM CHLORIDE: 9 INJECTION, SOLUTION INTRAVENOUS at 05:44

## 2017-03-31 RX ADMIN — INSULIN LISPRO 6 UNITS: 100 INJECTION, SOLUTION INTRAVENOUS; SUBCUTANEOUS at 17:00

## 2017-03-31 RX ADMIN — OXYCODONE HYDROCHLORIDE 10 MG: 5 TABLET ORAL at 03:37

## 2017-03-31 RX ADMIN — Medication 10 ML: at 08:17

## 2017-03-31 RX ADMIN — Medication 2 G: at 02:25

## 2017-03-31 RX ADMIN — LEVETIRACETAM 500 MG: 500 INJECTION, SOLUTION, CONCENTRATE INTRAVENOUS at 07:09

## 2017-03-31 RX ADMIN — OXYCODONE HYDROCHLORIDE 10 MG: 5 TABLET ORAL at 16:54

## 2017-03-31 RX ADMIN — HYDRALAZINE HYDROCHLORIDE 10 MG: 20 INJECTION INTRAMUSCULAR; INTRAVENOUS at 08:14

## 2017-03-31 RX ADMIN — ASPIRIN 81 MG: 81 TABLET, CHEWABLE ORAL at 07:42

## 2017-03-31 RX ADMIN — LORAZEPAM 4 MG: 2 TABLET ORAL at 13:54

## 2017-03-31 RX ADMIN — Medication 2 G: at 17:57

## 2017-03-31 RX ADMIN — DOCUSATE SODIUM 100 MG: 50 LIQUID ORAL at 07:42

## 2017-03-31 RX ADMIN — INSULIN LISPRO 4 UNITS: 100 INJECTION, SOLUTION INTRAVENOUS; SUBCUTANEOUS at 08:56

## 2017-03-31 RX ADMIN — FENTANYL CITRATE 100 MCG: 50 INJECTION, SOLUTION INTRAMUSCULAR; INTRAVENOUS at 07:17

## 2017-03-31 RX ADMIN — LORAZEPAM 4 MG: 2 TABLET ORAL at 07:13

## 2017-03-31 RX ADMIN — PROPOFOL 20 MCG/KG/MIN: 10 INJECTION, EMULSION INTRAVENOUS at 21:12

## 2017-03-31 RX ADMIN — AMIODARONE HYDROCHLORIDE 200 MG: 200 TABLET ORAL at 19:50

## 2017-03-31 RX ADMIN — OXYCODONE HYDROCHLORIDE 10 MG: 5 TABLET ORAL at 19:50

## 2017-03-31 RX ADMIN — HEPARIN SODIUM 5000 UNITS: 5000 INJECTION, SOLUTION INTRAVENOUS; SUBCUTANEOUS at 05:15

## 2017-03-31 RX ADMIN — LEVETIRACETAM 500 MG: 500 INJECTION, SOLUTION, CONCENTRATE INTRAVENOUS at 17:53

## 2017-03-31 RX ADMIN — METOCLOPRAMIDE 10 MG: 5 INJECTION, SOLUTION INTRAMUSCULAR; INTRAVENOUS at 17:54

## 2017-03-31 RX ADMIN — INSULIN LISPRO 4 UNITS: 100 INJECTION, SOLUTION INTRAVENOUS; SUBCUTANEOUS at 21:16

## 2017-03-31 RX ADMIN — INSULIN GLARGINE 15 UNITS: 100 INJECTION, SOLUTION SUBCUTANEOUS at 21:16

## 2017-03-31 RX ADMIN — PHENYTOIN SODIUM 100 MG: 50 INJECTION INTRAMUSCULAR; INTRAVENOUS at 02:26

## 2017-03-31 RX ADMIN — FENTANYL CITRATE 100 MCG: 50 INJECTION, SOLUTION INTRAMUSCULAR; INTRAVENOUS at 03:14

## 2017-03-31 RX ADMIN — INSULIN LISPRO 2 UNITS: 100 INJECTION, SOLUTION INTRAVENOUS; SUBCUTANEOUS at 02:30

## 2017-03-31 RX ADMIN — POTASSIUM CHLORIDE 40 MEQ: 40 SOLUTION ORAL at 07:50

## 2017-03-31 RX ADMIN — METOCLOPRAMIDE 10 MG: 5 INJECTION, SOLUTION INTRAMUSCULAR; INTRAVENOUS at 11:40

## 2017-03-31 RX ADMIN — HEPARIN SODIUM 5000 UNITS: 5000 INJECTION, SOLUTION INTRAVENOUS; SUBCUTANEOUS at 13:04

## 2017-03-31 RX ADMIN — PHENYTOIN SODIUM 100 MG: 50 INJECTION INTRAMUSCULAR; INTRAVENOUS at 19:46

## 2017-03-31 RX ADMIN — METOPROLOL TARTRATE 50 MG: 50 TABLET, FILM COATED ORAL at 19:50

## 2017-03-31 RX ADMIN — LORAZEPAM 4 MG: 2 TABLET ORAL at 22:08

## 2017-03-31 RX ADMIN — SENNA 8.6 MG: 8.6 TABLET, COATED ORAL at 19:50

## 2017-03-31 RX ADMIN — TAMSULOSIN HYDROCHLORIDE 0.4 MG: 0.4 CAPSULE ORAL at 07:41

## 2017-03-31 RX ADMIN — PHENYTOIN SODIUM 100 MG: 50 INJECTION INTRAMUSCULAR; INTRAVENOUS at 11:38

## 2017-03-31 ASSESSMENT — PULMONARY FUNCTION TESTS
PIF_VALUE: 11
PIF_VALUE: 6
PIF_VALUE: 13
PIF_VALUE: 16
PIF_VALUE: 16
PIF_VALUE: 12

## 2017-03-31 ASSESSMENT — PAIN SCALES - GENERAL: PAINLEVEL_OUTOF10: 6

## 2017-04-01 LAB
FIO2: 30
GLUCOSE BLD-MCNC: 186 MG/DL (ref 75–110)
GLUCOSE BLD-MCNC: 191 MG/DL (ref 75–110)
GLUCOSE BLD-MCNC: 207 MG/DL (ref 75–110)
GLUCOSE BLD-MCNC: 211 MG/DL (ref 75–110)
GLUCOSE BLD-MCNC: 228 MG/DL (ref 75–110)
NEGATIVE BASE EXCESS, ART: NORMAL (ref 0–2)
O2 DEVICE/FLOW/%: NORMAL
PATIENT TEMP: 37.7
POC HCO3: 25.4 MMOL/L (ref 22–27)
POC O2 SATURATION: 97 %
POC PCO2 TEMP: NORMAL MM HG
POC PCO2: 37 MM HG (ref 32–45)
POC PH TEMP: NORMAL
POC PH: 7.45 (ref 7.35–7.45)
POC PO2 TEMP: NORMAL MM HG
POC PO2: 83 MM HG (ref 75–95)
POSITIVE BASE EXCESS, ART: 1 (ref 0–2)
TCO2 (CALC), ART: 27 MM HG (ref 23–28)

## 2017-04-01 PROCEDURE — 6370000000 HC RX 637 (ALT 250 FOR IP): Performed by: INTERNAL MEDICINE

## 2017-04-01 PROCEDURE — 6360000002 HC RX W HCPCS: Performed by: PSYCHIATRY & NEUROLOGY

## 2017-04-01 PROCEDURE — 94770 HC ETCO2 MONITOR DAILY: CPT

## 2017-04-01 PROCEDURE — 2500000003 HC RX 250 WO HCPCS: Performed by: STUDENT IN AN ORGANIZED HEALTH CARE EDUCATION/TRAINING PROGRAM

## 2017-04-01 PROCEDURE — 6370000000 HC RX 637 (ALT 250 FOR IP): Performed by: HOSPITALIST

## 2017-04-01 PROCEDURE — 6360000002 HC RX W HCPCS: Performed by: EMERGENCY MEDICINE

## 2017-04-01 PROCEDURE — 2580000003 HC RX 258: Performed by: STUDENT IN AN ORGANIZED HEALTH CARE EDUCATION/TRAINING PROGRAM

## 2017-04-01 PROCEDURE — 6360000002 HC RX W HCPCS: Performed by: INTERNAL MEDICINE

## 2017-04-01 PROCEDURE — 82947 ASSAY GLUCOSE BLOOD QUANT: CPT

## 2017-04-01 PROCEDURE — 2580000003 HC RX 258: Performed by: EMERGENCY MEDICINE

## 2017-04-01 PROCEDURE — 94003 VENT MGMT INPAT SUBQ DAY: CPT

## 2017-04-01 PROCEDURE — 82803 BLOOD GASES ANY COMBINATION: CPT

## 2017-04-01 PROCEDURE — 2580000003 HC RX 258: Performed by: PSYCHIATRY & NEUROLOGY

## 2017-04-01 PROCEDURE — 99291 CRITICAL CARE FIRST HOUR: CPT | Performed by: INTERNAL MEDICINE

## 2017-04-01 PROCEDURE — 2580000003 HC RX 258: Performed by: INTERNAL MEDICINE

## 2017-04-01 PROCEDURE — 2000000000 HC ICU R&B

## 2017-04-01 RX ORDER — INSULIN GLARGINE 100 [IU]/ML
20 INJECTION, SOLUTION SUBCUTANEOUS NIGHTLY
Status: DISCONTINUED | OUTPATIENT
Start: 2017-04-01 | End: 2017-04-04

## 2017-04-01 RX ORDER — 0.9 % SODIUM CHLORIDE 0.9 %
500 INTRAVENOUS SOLUTION INTRAVENOUS ONCE
Status: COMPLETED | OUTPATIENT
Start: 2017-04-01 | End: 2017-04-01

## 2017-04-01 RX ADMIN — LORAZEPAM 4 MG: 2 TABLET ORAL at 15:00

## 2017-04-01 RX ADMIN — INSULIN LISPRO 2 UNITS: 100 INJECTION, SOLUTION INTRAVENOUS; SUBCUTANEOUS at 15:07

## 2017-04-01 RX ADMIN — LORAZEPAM 4 MG: 2 TABLET ORAL at 10:55

## 2017-04-01 RX ADMIN — OXYCODONE HYDROCHLORIDE 10 MG: 5 TABLET ORAL at 12:21

## 2017-04-01 RX ADMIN — METOCLOPRAMIDE 10 MG: 5 INJECTION, SOLUTION INTRAMUSCULAR; INTRAVENOUS at 05:46

## 2017-04-01 RX ADMIN — PROPOFOL 30 MCG/KG/MIN: 10 INJECTION, EMULSION INTRAVENOUS at 04:28

## 2017-04-01 RX ADMIN — PROPOFOL 15 MCG/KG/MIN: 10 INJECTION, EMULSION INTRAVENOUS at 11:56

## 2017-04-01 RX ADMIN — LORAZEPAM 4 MG: 2 TABLET ORAL at 05:46

## 2017-04-01 RX ADMIN — OXYCODONE HYDROCHLORIDE 10 MG: 5 TABLET ORAL at 08:06

## 2017-04-01 RX ADMIN — FENTANYL CITRATE 100 MCG: 50 INJECTION, SOLUTION INTRAMUSCULAR; INTRAVENOUS at 22:06

## 2017-04-01 RX ADMIN — LANSOPRAZOLE 30 MG: 30 TABLET, ORALLY DISINTEGRATING, DELAYED RELEASE ORAL at 05:47

## 2017-04-01 RX ADMIN — PHENYTOIN SODIUM 100 MG: 50 INJECTION INTRAMUSCULAR; INTRAVENOUS at 10:54

## 2017-04-01 RX ADMIN — DEXMEDETOMIDINE HYDROCHLORIDE 0.2 MCG/KG/HR: 100 INJECTION, SOLUTION INTRAVENOUS at 13:51

## 2017-04-01 RX ADMIN — SODIUM CHLORIDE 500 ML: 9 INJECTION, SOLUTION INTRAVENOUS at 22:23

## 2017-04-01 RX ADMIN — SODIUM CHLORIDE 25 ML/HR: 9 INJECTION, SOLUTION INTRAVENOUS at 17:38

## 2017-04-01 RX ADMIN — ASPIRIN 81 MG: 81 TABLET, CHEWABLE ORAL at 08:38

## 2017-04-01 RX ADMIN — METOCLOPRAMIDE 10 MG: 5 INJECTION, SOLUTION INTRAMUSCULAR; INTRAVENOUS at 12:21

## 2017-04-01 RX ADMIN — INSULIN LISPRO 2 UNITS: 100 INJECTION, SOLUTION INTRAVENOUS; SUBCUTANEOUS at 08:32

## 2017-04-01 RX ADMIN — LEVETIRACETAM 500 MG: 500 INJECTION, SOLUTION, CONCENTRATE INTRAVENOUS at 05:46

## 2017-04-01 RX ADMIN — OXYCODONE HYDROCHLORIDE 10 MG: 5 TABLET ORAL at 16:38

## 2017-04-01 RX ADMIN — Medication 2 G: at 01:24

## 2017-04-01 RX ADMIN — DEXMEDETOMIDINE HYDROCHLORIDE 0.4 MCG/KG/HR: 100 INJECTION, SOLUTION INTRAVENOUS at 23:01

## 2017-04-01 RX ADMIN — Medication 2 G: at 11:00

## 2017-04-01 RX ADMIN — AMIODARONE HYDROCHLORIDE 200 MG: 200 TABLET ORAL at 08:38

## 2017-04-01 RX ADMIN — LORAZEPAM 4 MG: 2 TABLET ORAL at 18:00

## 2017-04-01 RX ADMIN — INSULIN GLARGINE 20 UNITS: 100 INJECTION, SOLUTION SUBCUTANEOUS at 20:27

## 2017-04-01 RX ADMIN — DOCUSATE SODIUM 100 MG: 50 LIQUID ORAL at 08:38

## 2017-04-01 RX ADMIN — SODIUM CHLORIDE: 9 INJECTION, SOLUTION INTRAVENOUS at 17:37

## 2017-04-01 RX ADMIN — Medication 2 G: at 18:34

## 2017-04-01 RX ADMIN — INSULIN LISPRO 4 UNITS: 100 INJECTION, SOLUTION INTRAVENOUS; SUBCUTANEOUS at 20:26

## 2017-04-01 RX ADMIN — OXYCODONE HYDROCHLORIDE 10 MG: 5 TABLET ORAL at 04:11

## 2017-04-01 RX ADMIN — LORAZEPAM 4 MG: 2 TABLET ORAL at 22:50

## 2017-04-01 RX ADMIN — LEVETIRACETAM 500 MG: 500 INJECTION, SOLUTION, CONCENTRATE INTRAVENOUS at 17:46

## 2017-04-01 RX ADMIN — PHENYTOIN SODIUM 100 MG: 50 INJECTION INTRAMUSCULAR; INTRAVENOUS at 03:08

## 2017-04-01 RX ADMIN — LORAZEPAM 4 MG: 2 TABLET ORAL at 02:08

## 2017-04-01 RX ADMIN — INSULIN LISPRO 4 UNITS: 100 INJECTION, SOLUTION INTRAVENOUS; SUBCUTANEOUS at 02:55

## 2017-04-01 RX ADMIN — METOPROLOL TARTRATE 50 MG: 50 TABLET, FILM COATED ORAL at 08:38

## 2017-04-01 RX ADMIN — METOCLOPRAMIDE 10 MG: 5 INJECTION, SOLUTION INTRAMUSCULAR; INTRAVENOUS at 18:09

## 2017-04-01 RX ADMIN — SENNA 8.6 MG: 8.6 TABLET, COATED ORAL at 21:30

## 2017-04-01 RX ADMIN — SODIUM CHLORIDE 500 ML: 9 INJECTION, SOLUTION INTRAVENOUS at 19:56

## 2017-04-01 RX ADMIN — PHENYTOIN SODIUM 100 MG: 50 INJECTION INTRAMUSCULAR; INTRAVENOUS at 18:31

## 2017-04-01 RX ADMIN — OXYCODONE HYDROCHLORIDE 10 MG: 5 TABLET ORAL at 20:26

## 2017-04-01 RX ADMIN — TAMSULOSIN HYDROCHLORIDE 0.4 MG: 0.4 CAPSULE ORAL at 08:38

## 2017-04-01 ASSESSMENT — PULMONARY FUNCTION TESTS
PIF_VALUE: 18
PIF_VALUE: 12
PIF_VALUE: 19
PIF_VALUE: 17
PIF_VALUE: 12
PIF_VALUE: 18
PIF_VALUE: 19

## 2017-04-02 ENCOUNTER — APPOINTMENT (OUTPATIENT)
Dept: GENERAL RADIOLOGY | Age: 72
DRG: 870 | End: 2017-04-02
Payer: MEDICARE

## 2017-04-02 LAB
ABSOLUTE EOS #: 0.25 K/UL (ref 0–0.4)
ABSOLUTE LYMPH #: 1.13 K/UL (ref 1–4.8)
ABSOLUTE MONO #: 1.01 K/UL (ref 0.1–0.8)
ANION GAP SERPL CALCULATED.3IONS-SCNC: 11 MMOL/L (ref 9–17)
BASOPHILS # BLD: 0 % (ref 0–2)
BASOPHILS ABSOLUTE: 0 K/UL (ref 0–0.2)
BUN BLDV-MCNC: 16 MG/DL (ref 8–23)
BUN/CREAT BLD: ABNORMAL (ref 9–20)
C-REACTIVE PROTEIN: 76.4 MG/L (ref 0–5)
CALCIUM SERPL-MCNC: 7.7 MG/DL (ref 8.6–10.4)
CHLORIDE BLD-SCNC: 105 MMOL/L (ref 98–107)
CO2: 25 MMOL/L (ref 20–31)
CREAT SERPL-MCNC: 0.47 MG/DL (ref 0.7–1.2)
DIFFERENTIAL TYPE: ABNORMAL
DIRECT EXAM: NORMAL
DIRECT EXAM: NORMAL
EOSINOPHILS RELATIVE PERCENT: 2 % (ref 1–4)
FIO2: 30
GFR AFRICAN AMERICAN: >60 ML/MIN
GFR NON-AFRICAN AMERICAN: >60 ML/MIN
GFR SERPL CREATININE-BSD FRML MDRD: ABNORMAL ML/MIN/{1.73_M2}
GFR SERPL CREATININE-BSD FRML MDRD: ABNORMAL ML/MIN/{1.73_M2}
GLUCOSE BLD-MCNC: 188 MG/DL (ref 75–110)
GLUCOSE BLD-MCNC: 214 MG/DL (ref 75–110)
GLUCOSE BLD-MCNC: 223 MG/DL (ref 75–110)
GLUCOSE BLD-MCNC: 229 MG/DL (ref 70–99)
HCT VFR BLD CALC: 32.1 % (ref 41–53)
HEMOGLOBIN: 10.8 G/DL (ref 13.5–17.5)
LYMPHOCYTES # BLD: 9 % (ref 24–44)
Lab: NORMAL
MAGNESIUM: 2 MG/DL (ref 1.6–2.6)
MCH RBC QN AUTO: 30.3 PG (ref 26–34)
MCHC RBC AUTO-ENTMCNC: 33.7 G/DL (ref 31–37)
MCV RBC AUTO: 89.9 FL (ref 80–100)
MONOCYTES # BLD: 8 % (ref 1–7)
MORPHOLOGY: NORMAL
NEGATIVE BASE EXCESS, ART: ABNORMAL (ref 0–2)
O2 DEVICE/FLOW/%: ABNORMAL
PATIENT TEMP: 37.7
PDW BLD-RTO: 14.9 % (ref 12.5–15.4)
PHOSPHORUS: 2.3 MG/DL (ref 2.5–4.5)
PLATELET # BLD: 262 K/UL (ref 140–450)
PLATELET ESTIMATE: ABNORMAL
PMV BLD AUTO: 10.8 FL (ref 6–12)
POC HCO3: 25.6 MMOL/L (ref 22–27)
POC O2 SATURATION: 82 %
POC PCO2 TEMP: ABNORMAL MM HG
POC PCO2: 37 MM HG (ref 32–45)
POC PH TEMP: ABNORMAL
POC PH: 7.45 (ref 7.35–7.45)
POC PO2 TEMP: ABNORMAL MM HG
POC PO2: 44 MM HG (ref 75–95)
POSITIVE BASE EXCESS, ART: 2 (ref 0–2)
POTASSIUM SERPL-SCNC: 3.3 MMOL/L (ref 3.7–5.3)
RBC # BLD: 3.57 M/UL (ref 4.5–5.9)
RBC # BLD: ABNORMAL 10*6/UL
SEG NEUTROPHILS: 81 % (ref 36–66)
SEGMENTED NEUTROPHILS ABSOLUTE COUNT: 10.21 K/UL (ref 1.8–7.7)
SODIUM BLD-SCNC: 141 MMOL/L (ref 135–144)
SPECIMEN DESCRIPTION: NORMAL
STATUS: NORMAL
TCO2 (CALC), ART: 27 MM HG (ref 23–28)
WBC # BLD: 12.6 K/UL (ref 3.5–11)
WBC # BLD: ABNORMAL 10*3/UL

## 2017-04-02 PROCEDURE — 6370000000 HC RX 637 (ALT 250 FOR IP)

## 2017-04-02 PROCEDURE — 6360000002 HC RX W HCPCS: Performed by: INTERNAL MEDICINE

## 2017-04-02 PROCEDURE — 86140 C-REACTIVE PROTEIN: CPT

## 2017-04-02 PROCEDURE — 6370000000 HC RX 637 (ALT 250 FOR IP): Performed by: HOSPITALIST

## 2017-04-02 PROCEDURE — 2580000003 HC RX 258: Performed by: INTERNAL MEDICINE

## 2017-04-02 PROCEDURE — 82803 BLOOD GASES ANY COMBINATION: CPT

## 2017-04-02 PROCEDURE — 83735 ASSAY OF MAGNESIUM: CPT

## 2017-04-02 PROCEDURE — 2500000003 HC RX 250 WO HCPCS: Performed by: EMERGENCY MEDICINE

## 2017-04-02 PROCEDURE — 6370000000 HC RX 637 (ALT 250 FOR IP): Performed by: INTERNAL MEDICINE

## 2017-04-02 PROCEDURE — 97530 THERAPEUTIC ACTIVITIES: CPT

## 2017-04-02 PROCEDURE — 6360000002 HC RX W HCPCS

## 2017-04-02 PROCEDURE — 99291 CRITICAL CARE FIRST HOUR: CPT | Performed by: INTERNAL MEDICINE

## 2017-04-02 PROCEDURE — 94003 VENT MGMT INPAT SUBQ DAY: CPT

## 2017-04-02 PROCEDURE — 84100 ASSAY OF PHOSPHORUS: CPT

## 2017-04-02 PROCEDURE — 6360000002 HC RX W HCPCS: Performed by: PSYCHIATRY & NEUROLOGY

## 2017-04-02 PROCEDURE — 85025 COMPLETE CBC W/AUTO DIFF WBC: CPT

## 2017-04-02 PROCEDURE — 94770 HC ETCO2 MONITOR DAILY: CPT

## 2017-04-02 PROCEDURE — 80048 BASIC METABOLIC PNL TOTAL CA: CPT

## 2017-04-02 PROCEDURE — 94640 AIRWAY INHALATION TREATMENT: CPT

## 2017-04-02 PROCEDURE — G8987 SELF CARE CURRENT STATUS: HCPCS

## 2017-04-02 PROCEDURE — 2580000003 HC RX 258: Performed by: EMERGENCY MEDICINE

## 2017-04-02 PROCEDURE — 2500000003 HC RX 250 WO HCPCS: Performed by: INTERNAL MEDICINE

## 2017-04-02 PROCEDURE — 97167 OT EVAL HIGH COMPLEX 60 MIN: CPT

## 2017-04-02 PROCEDURE — 82947 ASSAY GLUCOSE BLOOD QUANT: CPT

## 2017-04-02 PROCEDURE — 1200000000 HC SEMI PRIVATE

## 2017-04-02 PROCEDURE — G8988 SELF CARE GOAL STATUS: HCPCS

## 2017-04-02 PROCEDURE — 36415 COLL VENOUS BLD VENIPUNCTURE: CPT

## 2017-04-02 PROCEDURE — 2580000003 HC RX 258: Performed by: PSYCHIATRY & NEUROLOGY

## 2017-04-02 PROCEDURE — 97535 SELF CARE MNGMENT TRAINING: CPT

## 2017-04-02 PROCEDURE — 71020 XR CHEST STANDARD TWO VW: CPT

## 2017-04-02 PROCEDURE — 94762 N-INVAS EAR/PLS OXIMTRY CONT: CPT

## 2017-04-02 RX ORDER — PROPOFOL 10 MG/ML
INJECTION, EMULSION INTRAVENOUS
Status: COMPLETED
Start: 2017-04-02 | End: 2017-04-02

## 2017-04-02 RX ORDER — LORAZEPAM 2 MG/1
TABLET ORAL
Status: COMPLETED
Start: 2017-04-02 | End: 2017-04-02

## 2017-04-02 RX ORDER — FENTANYL CITRATE 50 UG/ML
INJECTION, SOLUTION INTRAMUSCULAR; INTRAVENOUS
Status: COMPLETED
Start: 2017-04-02 | End: 2017-04-02

## 2017-04-02 RX ORDER — SODIUM CHLORIDE 9 MG/ML
INJECTION, SOLUTION INTRAVENOUS CONTINUOUS
Status: DISCONTINUED | OUTPATIENT
Start: 2017-04-02 | End: 2017-04-04

## 2017-04-02 RX ORDER — OXYCODONE HYDROCHLORIDE 5 MG/1
TABLET ORAL
Status: COMPLETED
Start: 2017-04-02 | End: 2017-04-02

## 2017-04-02 RX ORDER — AMLODIPINE BESYLATE 5 MG/1
5 TABLET ORAL DAILY
Status: DISCONTINUED | OUTPATIENT
Start: 2017-04-02 | End: 2017-04-03

## 2017-04-02 RX ORDER — PHENYTOIN SODIUM 100 MG/1
100 CAPSULE, EXTENDED RELEASE ORAL 3 TIMES DAILY
Status: DISCONTINUED | OUTPATIENT
Start: 2017-04-02 | End: 2017-04-05 | Stop reason: HOSPADM

## 2017-04-02 RX ORDER — ALBUTEROL SULFATE 2.5 MG/3ML
2.5 SOLUTION RESPIRATORY (INHALATION) 4 TIMES DAILY
Status: DISCONTINUED | OUTPATIENT
Start: 2017-04-02 | End: 2017-04-03

## 2017-04-02 RX ORDER — METOPROLOL TARTRATE 50 MG/1
100 TABLET, FILM COATED ORAL 2 TIMES DAILY
Status: DISCONTINUED | OUTPATIENT
Start: 2017-04-02 | End: 2017-04-04

## 2017-04-02 RX ADMIN — INSULIN LISPRO 4 UNITS: 100 INJECTION, SOLUTION INTRAVENOUS; SUBCUTANEOUS at 15:16

## 2017-04-02 RX ADMIN — EXTENDED PHENYTOIN SODIUM 100 MG: 100 CAPSULE ORAL at 22:09

## 2017-04-02 RX ADMIN — INSULIN LISPRO 2 UNITS: 100 INJECTION, SOLUTION INTRAVENOUS; SUBCUTANEOUS at 21:42

## 2017-04-02 RX ADMIN — METOCLOPRAMIDE 10 MG: 5 INJECTION, SOLUTION INTRAMUSCULAR; INTRAVENOUS at 00:29

## 2017-04-02 RX ADMIN — PHENYTOIN SODIUM 100 MG: 50 INJECTION INTRAMUSCULAR; INTRAVENOUS at 03:23

## 2017-04-02 RX ADMIN — PROPOFOL 15 MCG/KG/MIN: 10 INJECTION, EMULSION INTRAVENOUS at 01:15

## 2017-04-02 RX ADMIN — SODIUM CHLORIDE: 9 INJECTION, SOLUTION INTRAVENOUS at 17:26

## 2017-04-02 RX ADMIN — EXTENDED PHENYTOIN SODIUM 100 MG: 100 CAPSULE ORAL at 13:41

## 2017-04-02 RX ADMIN — TAMSULOSIN HYDROCHLORIDE 0.4 MG: 0.4 CAPSULE ORAL at 07:59

## 2017-04-02 RX ADMIN — METOCLOPRAMIDE 10 MG: 5 INJECTION, SOLUTION INTRAMUSCULAR; INTRAVENOUS at 05:44

## 2017-04-02 RX ADMIN — METOPROLOL TARTRATE 50 MG: 50 TABLET, FILM COATED ORAL at 04:03

## 2017-04-02 RX ADMIN — AMIODARONE HYDROCHLORIDE 200 MG: 200 TABLET ORAL at 09:03

## 2017-04-02 RX ADMIN — AMIODARONE HYDROCHLORIDE 200 MG: 200 TABLET ORAL at 04:04

## 2017-04-02 RX ADMIN — METOCLOPRAMIDE 10 MG: 5 INJECTION, SOLUTION INTRAMUSCULAR; INTRAVENOUS at 12:23

## 2017-04-02 RX ADMIN — SODIUM PHOSPHATE, MONOBASIC, MONOHYDRATE AND SODIUM PHOSPHATE, DIBASIC, ANHYDROUS 10 MMOL: 276; 142 INJECTION, SOLUTION INTRAVENOUS at 12:21

## 2017-04-02 RX ADMIN — LORAZEPAM 4 MG: 2 TABLET ORAL at 05:44

## 2017-04-02 RX ADMIN — Medication 10 ML: at 21:35

## 2017-04-02 RX ADMIN — METOPROLOL TARTRATE 100 MG: 50 TABLET, FILM COATED ORAL at 21:26

## 2017-04-02 RX ADMIN — AMLODIPINE BESYLATE 5 MG: 5 TABLET ORAL at 12:28

## 2017-04-02 RX ADMIN — ALBUTEROL SULFATE 2.5 MG: 2.5 SOLUTION RESPIRATORY (INHALATION) at 17:30

## 2017-04-02 RX ADMIN — FENTANYL CITRATE 100 MCG: 50 INJECTION, SOLUTION INTRAMUSCULAR; INTRAVENOUS at 04:22

## 2017-04-02 RX ADMIN — LANSOPRAZOLE 30 MG: 30 TABLET, ORALLY DISINTEGRATING, DELAYED RELEASE ORAL at 05:44

## 2017-04-02 RX ADMIN — OXYCODONE HYDROCHLORIDE 10 MG: 5 TABLET ORAL at 04:04

## 2017-04-02 RX ADMIN — Medication 2 G: at 10:22

## 2017-04-02 RX ADMIN — Medication 2 G: at 18:08

## 2017-04-02 RX ADMIN — AMIODARONE HYDROCHLORIDE 200 MG: 200 TABLET ORAL at 21:25

## 2017-04-02 RX ADMIN — INSULIN GLARGINE 20 UNITS: 100 INJECTION, SOLUTION SUBCUTANEOUS at 21:49

## 2017-04-02 RX ADMIN — METOPROLOL TARTRATE 5 MG: 5 INJECTION INTRAVENOUS at 14:06

## 2017-04-02 RX ADMIN — LORAZEPAM 4 MG: 2 TABLET ORAL at 03:21

## 2017-04-02 RX ADMIN — LEVETIRACETAM 500 MG: 500 INJECTION, SOLUTION, CONCENTRATE INTRAVENOUS at 17:27

## 2017-04-02 RX ADMIN — ALBUTEROL SULFATE 2.5 MG: 2.5 SOLUTION RESPIRATORY (INHALATION) at 19:42

## 2017-04-02 RX ADMIN — METOCLOPRAMIDE 10 MG: 5 INJECTION, SOLUTION INTRAMUSCULAR; INTRAVENOUS at 18:08

## 2017-04-02 RX ADMIN — INSULIN LISPRO 2 UNITS: 100 INJECTION, SOLUTION INTRAVENOUS; SUBCUTANEOUS at 10:29

## 2017-04-02 RX ADMIN — HYDRALAZINE HYDROCHLORIDE 10 MG: 20 INJECTION INTRAMUSCULAR; INTRAVENOUS at 10:57

## 2017-04-02 RX ADMIN — SODIUM CHLORIDE: 9 INJECTION, SOLUTION INTRAVENOUS at 21:30

## 2017-04-02 RX ADMIN — INSULIN LISPRO 4 UNITS: 100 INJECTION, SOLUTION INTRAVENOUS; SUBCUTANEOUS at 03:42

## 2017-04-02 RX ADMIN — POTASSIUM CHLORIDE 40 MEQ: 40 SOLUTION ORAL at 07:52

## 2017-04-02 RX ADMIN — Medication 2 G: at 03:34

## 2017-04-02 RX ADMIN — LEVETIRACETAM 500 MG: 500 INJECTION, SOLUTION, CONCENTRATE INTRAVENOUS at 05:45

## 2017-04-02 ASSESSMENT — PULMONARY FUNCTION TESTS
PIF_VALUE: 12
PIF_VALUE: 14

## 2017-04-02 ASSESSMENT — PAIN SCALES - GENERAL
PAINLEVEL_OUTOF10: 0
PAINLEVEL_OUTOF10: 0

## 2017-04-03 PROBLEM — E87.29 HIGH ANION GAP METABOLIC ACIDOSIS: Status: ACTIVE | Noted: 2017-03-21

## 2017-04-03 LAB
GLUCOSE BLD-MCNC: 112 MG/DL (ref 75–110)
GLUCOSE BLD-MCNC: 185 MG/DL (ref 75–110)
GLUCOSE BLD-MCNC: 83 MG/DL (ref 75–110)

## 2017-04-03 PROCEDURE — 99233 SBSQ HOSP IP/OBS HIGH 50: CPT | Performed by: INTERNAL MEDICINE

## 2017-04-03 PROCEDURE — 6360000002 HC RX W HCPCS: Performed by: INTERNAL MEDICINE

## 2017-04-03 PROCEDURE — 6370000000 HC RX 637 (ALT 250 FOR IP): Performed by: INTERNAL MEDICINE

## 2017-04-03 PROCEDURE — 1200000000 HC SEMI PRIVATE

## 2017-04-03 PROCEDURE — G8997 SWALLOW GOAL STATUS: HCPCS

## 2017-04-03 PROCEDURE — G8996 SWALLOW CURRENT STATUS: HCPCS

## 2017-04-03 PROCEDURE — 2580000003 HC RX 258: Performed by: EMERGENCY MEDICINE

## 2017-04-03 PROCEDURE — 97164 PT RE-EVAL EST PLAN CARE: CPT

## 2017-04-03 PROCEDURE — 97535 SELF CARE MNGMENT TRAINING: CPT

## 2017-04-03 PROCEDURE — 97530 THERAPEUTIC ACTIVITIES: CPT

## 2017-04-03 PROCEDURE — 99232 SBSQ HOSP IP/OBS MODERATE 35: CPT | Performed by: NEUROLOGICAL SURGERY

## 2017-04-03 PROCEDURE — G8979 MOBILITY GOAL STATUS: HCPCS

## 2017-04-03 PROCEDURE — 2580000003 HC RX 258: Performed by: INTERNAL MEDICINE

## 2017-04-03 PROCEDURE — 82947 ASSAY GLUCOSE BLOOD QUANT: CPT

## 2017-04-03 PROCEDURE — 92610 EVALUATE SWALLOWING FUNCTION: CPT

## 2017-04-03 PROCEDURE — G8998 SWALLOW D/C STATUS: HCPCS

## 2017-04-03 PROCEDURE — G0108 DIAB MANAGE TRN  PER INDIV: HCPCS

## 2017-04-03 PROCEDURE — 2500000003 HC RX 250 WO HCPCS: Performed by: EMERGENCY MEDICINE

## 2017-04-03 PROCEDURE — 94762 N-INVAS EAR/PLS OXIMTRY CONT: CPT

## 2017-04-03 PROCEDURE — G8978 MOBILITY CURRENT STATUS: HCPCS

## 2017-04-03 RX ORDER — THIAMINE MONONITRATE (VIT B1) 100 MG
100 TABLET ORAL DAILY
Status: DISCONTINUED | OUTPATIENT
Start: 2017-04-03 | End: 2017-04-05 | Stop reason: HOSPADM

## 2017-04-03 RX ORDER — GUAIFENESIN 600 MG/1
600 TABLET, EXTENDED RELEASE ORAL 2 TIMES DAILY
Status: DISCONTINUED | OUTPATIENT
Start: 2017-04-03 | End: 2017-04-05 | Stop reason: HOSPADM

## 2017-04-03 RX ORDER — ATORVASTATIN CALCIUM 40 MG/1
40 TABLET, FILM COATED ORAL NIGHTLY
Status: DISCONTINUED | OUTPATIENT
Start: 2017-04-03 | End: 2017-04-05 | Stop reason: HOSPADM

## 2017-04-03 RX ORDER — LEVETIRACETAM 500 MG/1
500 TABLET ORAL 2 TIMES DAILY
Status: DISCONTINUED | OUTPATIENT
Start: 2017-04-04 | End: 2017-04-05 | Stop reason: HOSPADM

## 2017-04-03 RX ORDER — FOLIC ACID 1 MG/1
1 TABLET ORAL DAILY
Status: DISCONTINUED | OUTPATIENT
Start: 2017-04-03 | End: 2017-04-05 | Stop reason: HOSPADM

## 2017-04-03 RX ORDER — AMLODIPINE BESYLATE 10 MG/1
10 TABLET ORAL DAILY
Status: DISCONTINUED | OUTPATIENT
Start: 2017-04-03 | End: 2017-04-05 | Stop reason: HOSPADM

## 2017-04-03 RX ORDER — CEPHALEXIN 500 MG/1
500 CAPSULE ORAL EVERY 6 HOURS SCHEDULED
Status: DISCONTINUED | OUTPATIENT
Start: 2017-04-03 | End: 2017-04-05 | Stop reason: HOSPADM

## 2017-04-03 RX ADMIN — AMLODIPINE BESYLATE 10 MG: 10 TABLET ORAL at 08:23

## 2017-04-03 RX ADMIN — ENOXAPARIN SODIUM 40 MG: 40 INJECTION SUBCUTANEOUS at 08:23

## 2017-04-03 RX ADMIN — FOLIC ACID 1 MG: 1 TABLET ORAL at 14:17

## 2017-04-03 RX ADMIN — GUAIFENESIN 600 MG: 600 TABLET, EXTENDED RELEASE ORAL at 14:17

## 2017-04-03 RX ADMIN — METOPROLOL TARTRATE 100 MG: 50 TABLET, FILM COATED ORAL at 08:23

## 2017-04-03 RX ADMIN — EXTENDED PHENYTOIN SODIUM 100 MG: 100 CAPSULE ORAL at 21:29

## 2017-04-03 RX ADMIN — DOCUSATE SODIUM 100 MG: 50 LIQUID ORAL at 08:23

## 2017-04-03 RX ADMIN — ASPIRIN 81 MG: 81 TABLET, CHEWABLE ORAL at 08:23

## 2017-04-03 RX ADMIN — AMIODARONE HYDROCHLORIDE 200 MG: 200 TABLET ORAL at 21:29

## 2017-04-03 RX ADMIN — Medication 10 ML: at 08:24

## 2017-04-03 RX ADMIN — EXTENDED PHENYTOIN SODIUM 100 MG: 100 CAPSULE ORAL at 14:17

## 2017-04-03 RX ADMIN — TAMSULOSIN HYDROCHLORIDE 0.4 MG: 0.4 CAPSULE ORAL at 08:23

## 2017-04-03 RX ADMIN — Medication 2 G: at 11:40

## 2017-04-03 RX ADMIN — METOPROLOL TARTRATE 5 MG: 5 INJECTION INTRAVENOUS at 16:51

## 2017-04-03 RX ADMIN — Medication 100 MG: at 14:17

## 2017-04-03 RX ADMIN — GUAIFENESIN 600 MG: 600 TABLET, EXTENDED RELEASE ORAL at 21:29

## 2017-04-03 RX ADMIN — SENNA 8.6 MG: 8.6 TABLET, COATED ORAL at 21:29

## 2017-04-03 RX ADMIN — ATORVASTATIN CALCIUM 40 MG: 40 TABLET, FILM COATED ORAL at 21:29

## 2017-04-03 RX ADMIN — METOPROLOL TARTRATE 100 MG: 50 TABLET, FILM COATED ORAL at 21:29

## 2017-04-03 RX ADMIN — LANSOPRAZOLE 30 MG: 30 TABLET, ORALLY DISINTEGRATING, DELAYED RELEASE ORAL at 06:35

## 2017-04-03 RX ADMIN — Medication 2 G: at 03:11

## 2017-04-03 RX ADMIN — AMIODARONE HYDROCHLORIDE 200 MG: 200 TABLET ORAL at 08:23

## 2017-04-03 RX ADMIN — SENNA 8.6 MG: 8.6 TABLET, COATED ORAL at 01:13

## 2017-04-03 RX ADMIN — CEPHALEXIN 500 MG: 500 CAPSULE ORAL at 21:29

## 2017-04-03 RX ADMIN — EXTENDED PHENYTOIN SODIUM 100 MG: 100 CAPSULE ORAL at 08:23

## 2017-04-03 RX ADMIN — ATORVASTATIN CALCIUM 40 MG: 40 TABLET, FILM COATED ORAL at 01:14

## 2017-04-03 ASSESSMENT — PAIN DESCRIPTION - LOCATION: LOCATION: GENERALIZED

## 2017-04-03 ASSESSMENT — PAIN DESCRIPTION - PAIN TYPE: TYPE: ACUTE PAIN

## 2017-04-03 ASSESSMENT — PAIN DESCRIPTION - DESCRIPTORS: DESCRIPTORS: SORE

## 2017-04-03 ASSESSMENT — PAIN SCALES - GENERAL: PAINLEVEL_OUTOF10: 7

## 2017-04-04 LAB
ABSOLUTE EOS #: 0.2 K/UL (ref 0–0.4)
ABSOLUTE EOS #: 0.2 K/UL (ref 0–0.4)
ABSOLUTE LYMPH #: 1.2 K/UL (ref 1–4.8)
ABSOLUTE LYMPH #: 1.3 K/UL (ref 1–4.8)
ABSOLUTE MONO #: 0.9 K/UL (ref 0.1–1.2)
ABSOLUTE MONO #: 1.3 K/UL (ref 0.1–1.2)
ANION GAP SERPL CALCULATED.3IONS-SCNC: 15 MMOL/L (ref 9–17)
BASOPHILS # BLD: 1 % (ref 0–2)
BASOPHILS # BLD: 1 % (ref 0–2)
BASOPHILS ABSOLUTE: 0.1 K/UL (ref 0–0.2)
BASOPHILS ABSOLUTE: 0.2 K/UL (ref 0–0.2)
BUN BLDV-MCNC: 6 MG/DL (ref 8–23)
BUN/CREAT BLD: ABNORMAL (ref 9–20)
CALCIUM IONIZED: 1.11 MMOL/L (ref 1.13–1.33)
CALCIUM SERPL-MCNC: 8.5 MG/DL (ref 8.6–10.4)
CHLORIDE BLD-SCNC: 101 MMOL/L (ref 98–107)
CO2: 24 MMOL/L (ref 20–31)
CREAT SERPL-MCNC: 0.43 MG/DL (ref 0.7–1.2)
DIFFERENTIAL TYPE: ABNORMAL
DIFFERENTIAL TYPE: ABNORMAL
EOSINOPHILS RELATIVE PERCENT: 1 % (ref 1–4)
EOSINOPHILS RELATIVE PERCENT: 2 % (ref 1–4)
ESTIMATED AVERAGE GLUCOSE: 203 MG/DL
GFR AFRICAN AMERICAN: >60 ML/MIN
GFR NON-AFRICAN AMERICAN: >60 ML/MIN
GFR SERPL CREATININE-BSD FRML MDRD: ABNORMAL ML/MIN/{1.73_M2}
GFR SERPL CREATININE-BSD FRML MDRD: ABNORMAL ML/MIN/{1.73_M2}
GLUCOSE BLD-MCNC: 146 MG/DL (ref 70–99)
GLUCOSE BLD-MCNC: 172 MG/DL (ref 75–110)
GLUCOSE BLD-MCNC: 173 MG/DL (ref 75–110)
GLUCOSE BLD-MCNC: 180 MG/DL (ref 75–110)
GLUCOSE BLD-MCNC: 182 MG/DL (ref 75–110)
GLUCOSE BLD-MCNC: 188 MG/DL (ref 75–110)
GLUCOSE BLD-MCNC: 200 MG/DL (ref 75–110)
GLUCOSE BLD-MCNC: 201 MG/DL (ref 75–110)
GLUCOSE BLD-MCNC: 232 MG/DL (ref 75–110)
GLUCOSE BLD-MCNC: 65 MG/DL (ref 75–110)
GLUCOSE BLD-MCNC: 82 MG/DL (ref 75–110)
GLUCOSE BLD-MCNC: 99 MG/DL (ref 75–110)
HBA1C MFR BLD: 8.7 % (ref 4–6)
HCT VFR BLD CALC: 41.3 % (ref 41–53)
HCT VFR BLD CALC: 43.3 % (ref 41–53)
HEMOGLOBIN: 13.9 G/DL (ref 13.5–17.5)
HEMOGLOBIN: 14.5 G/DL (ref 13.5–17.5)
LYMPHOCYTES # BLD: 10 % (ref 24–44)
LYMPHOCYTES # BLD: 8 % (ref 24–44)
MAGNESIUM: 2.1 MG/DL (ref 1.6–2.6)
MCH RBC QN AUTO: 29.9 PG (ref 26–34)
MCH RBC QN AUTO: 30 PG (ref 26–34)
MCHC RBC AUTO-ENTMCNC: 33.5 G/DL (ref 31–37)
MCHC RBC AUTO-ENTMCNC: 33.7 G/DL (ref 31–37)
MCV RBC AUTO: 88.6 FL (ref 80–100)
MCV RBC AUTO: 89.3 FL (ref 80–100)
MONOCYTES # BLD: 8 % (ref 2–11)
MONOCYTES # BLD: 8 % (ref 2–11)
PDW BLD-RTO: 14.8 % (ref 12.5–15.4)
PDW BLD-RTO: 14.8 % (ref 12.5–15.4)
PHOSPHORUS: 2.1 MG/DL (ref 2.5–4.5)
PLATELET # BLD: 393 K/UL (ref 140–450)
PLATELET # BLD: 455 K/UL (ref 140–450)
PLATELET ESTIMATE: ABNORMAL
PLATELET ESTIMATE: ABNORMAL
PMV BLD AUTO: 10.5 FL (ref 6–12)
PMV BLD AUTO: 9.9 FL (ref 6–12)
POTASSIUM SERPL-SCNC: 2.8 MMOL/L (ref 3.7–5.3)
POTASSIUM SERPL-SCNC: 4 MMOL/L (ref 3.7–5.3)
RBC # BLD: 4.67 M/UL (ref 4.5–5.9)
RBC # BLD: 4.84 M/UL (ref 4.5–5.9)
RBC # BLD: ABNORMAL 10*6/UL
RBC # BLD: ABNORMAL 10*6/UL
SEG NEUTROPHILS: 79 % (ref 36–66)
SEG NEUTROPHILS: 82 % (ref 36–66)
SEGMENTED NEUTROPHILS ABSOLUTE COUNT: 13.5 K/UL (ref 1.8–7.7)
SEGMENTED NEUTROPHILS ABSOLUTE COUNT: 9.5 K/UL (ref 1.8–7.7)
SODIUM BLD-SCNC: 140 MMOL/L (ref 135–144)
WBC # BLD: 11.9 K/UL (ref 3.5–11)
WBC # BLD: 16.5 K/UL (ref 3.5–11)
WBC # BLD: ABNORMAL 10*3/UL
WBC # BLD: ABNORMAL 10*3/UL

## 2017-04-04 PROCEDURE — 97116 GAIT TRAINING THERAPY: CPT

## 2017-04-04 PROCEDURE — 6370000000 HC RX 637 (ALT 250 FOR IP): Performed by: INTERNAL MEDICINE

## 2017-04-04 PROCEDURE — 94762 N-INVAS EAR/PLS OXIMTRY CONT: CPT

## 2017-04-04 PROCEDURE — 2500000003 HC RX 250 WO HCPCS: Performed by: EMERGENCY MEDICINE

## 2017-04-04 PROCEDURE — 2580000003 HC RX 258: Performed by: EMERGENCY MEDICINE

## 2017-04-04 PROCEDURE — 36415 COLL VENOUS BLD VENIPUNCTURE: CPT

## 2017-04-04 PROCEDURE — 6360000002 HC RX W HCPCS: Performed by: INTERNAL MEDICINE

## 2017-04-04 PROCEDURE — 6360000002 HC RX W HCPCS: Performed by: STUDENT IN AN ORGANIZED HEALTH CARE EDUCATION/TRAINING PROGRAM

## 2017-04-04 PROCEDURE — 83036 HEMOGLOBIN GLYCOSYLATED A1C: CPT

## 2017-04-04 PROCEDURE — 97530 THERAPEUTIC ACTIVITIES: CPT

## 2017-04-04 PROCEDURE — 2580000003 HC RX 258: Performed by: INTERNAL MEDICINE

## 2017-04-04 PROCEDURE — 85025 COMPLETE CBC W/AUTO DIFF WBC: CPT

## 2017-04-04 PROCEDURE — 1200000000 HC SEMI PRIVATE

## 2017-04-04 PROCEDURE — 2580000003 HC RX 258: Performed by: STUDENT IN AN ORGANIZED HEALTH CARE EDUCATION/TRAINING PROGRAM

## 2017-04-04 PROCEDURE — 2500000003 HC RX 250 WO HCPCS: Performed by: INTERNAL MEDICINE

## 2017-04-04 PROCEDURE — 82947 ASSAY GLUCOSE BLOOD QUANT: CPT

## 2017-04-04 PROCEDURE — 6360000002 HC RX W HCPCS

## 2017-04-04 PROCEDURE — 84132 ASSAY OF SERUM POTASSIUM: CPT

## 2017-04-04 PROCEDURE — 84100 ASSAY OF PHOSPHORUS: CPT

## 2017-04-04 PROCEDURE — 82330 ASSAY OF CALCIUM: CPT

## 2017-04-04 PROCEDURE — 80048 BASIC METABOLIC PNL TOTAL CA: CPT

## 2017-04-04 PROCEDURE — 83735 ASSAY OF MAGNESIUM: CPT

## 2017-04-04 PROCEDURE — 6370000000 HC RX 637 (ALT 250 FOR IP): Performed by: HOSPITALIST

## 2017-04-04 PROCEDURE — 99233 SBSQ HOSP IP/OBS HIGH 50: CPT | Performed by: INTERNAL MEDICINE

## 2017-04-04 RX ORDER — GLIMEPIRIDE 2 MG/1
2 TABLET ORAL EVERY MORNING
Status: DISCONTINUED | OUTPATIENT
Start: 2017-04-04 | End: 2017-04-04

## 2017-04-04 RX ORDER — NICOTINE POLACRILEX 4 MG
15 LOZENGE BUCCAL PRN
Status: DISCONTINUED | OUTPATIENT
Start: 2017-04-04 | End: 2017-04-04

## 2017-04-04 RX ORDER — DEXTROSE MONOHYDRATE 50 MG/ML
100 INJECTION, SOLUTION INTRAVENOUS PRN
Status: DISCONTINUED | OUTPATIENT
Start: 2017-04-04 | End: 2017-04-05 | Stop reason: HOSPADM

## 2017-04-04 RX ORDER — METOPROLOL TARTRATE 50 MG/1
100 TABLET, FILM COATED ORAL 2 TIMES DAILY
Status: DISCONTINUED | OUTPATIENT
Start: 2017-04-04 | End: 2017-04-04

## 2017-04-04 RX ORDER — METOPROLOL TARTRATE 50 MG/1
150 TABLET, FILM COATED ORAL 2 TIMES DAILY
Status: DISCONTINUED | OUTPATIENT
Start: 2017-04-04 | End: 2017-04-04

## 2017-04-04 RX ORDER — 0.9 % SODIUM CHLORIDE 0.9 %
1000 INTRAVENOUS SOLUTION INTRAVENOUS ONCE
Status: COMPLETED | OUTPATIENT
Start: 2017-04-04 | End: 2017-04-04

## 2017-04-04 RX ORDER — POTASSIUM CHLORIDE 7.45 MG/ML
INJECTION INTRAVENOUS
Status: COMPLETED
Start: 2017-04-04 | End: 2017-04-04

## 2017-04-04 RX ORDER — METOPROLOL TARTRATE 50 MG/1
100 TABLET, FILM COATED ORAL 2 TIMES DAILY
Status: DISCONTINUED | OUTPATIENT
Start: 2017-04-04 | End: 2017-04-05 | Stop reason: HOSPADM

## 2017-04-04 RX ORDER — INSULIN GLARGINE 100 [IU]/ML
10 INJECTION, SOLUTION SUBCUTANEOUS NIGHTLY
Status: DISCONTINUED | OUTPATIENT
Start: 2017-04-04 | End: 2017-04-05

## 2017-04-04 RX ORDER — DEXTROSE MONOHYDRATE 25 G/50ML
12.5 INJECTION, SOLUTION INTRAVENOUS PRN
Status: DISCONTINUED | OUTPATIENT
Start: 2017-04-04 | End: 2017-04-04

## 2017-04-04 RX ORDER — POTASSIUM CHLORIDE 20 MEQ/1
60 TABLET, EXTENDED RELEASE ORAL ONCE
Status: COMPLETED | OUTPATIENT
Start: 2017-04-04 | End: 2017-04-04

## 2017-04-04 RX ORDER — INSULIN GLARGINE 100 [IU]/ML
15 INJECTION, SOLUTION SUBCUTANEOUS NIGHTLY
Status: DISCONTINUED | OUTPATIENT
Start: 2017-04-04 | End: 2017-04-04

## 2017-04-04 RX ORDER — LISINOPRIL 5 MG/1
5 TABLET ORAL DAILY
Status: DISCONTINUED | OUTPATIENT
Start: 2017-04-04 | End: 2017-04-05

## 2017-04-04 RX ADMIN — CALCIUM GLUCONATE 2 G: 94 INJECTION, SOLUTION INTRAVENOUS at 10:55

## 2017-04-04 RX ADMIN — GUAIFENESIN 600 MG: 600 TABLET, EXTENDED RELEASE ORAL at 10:53

## 2017-04-04 RX ADMIN — POTASSIUM CHLORIDE 50 MEQ: 1500 TABLET, EXTENDED RELEASE ORAL at 11:39

## 2017-04-04 RX ADMIN — SODIUM PHOSPHATE, MONOBASIC, MONOHYDRATE AND SODIUM PHOSPHATE, DIBASIC, ANHYDROUS 15 MMOL: 276; 142 INJECTION, SOLUTION INTRAVENOUS at 14:42

## 2017-04-04 RX ADMIN — EXTENDED PHENYTOIN SODIUM 100 MG: 100 CAPSULE ORAL at 22:40

## 2017-04-04 RX ADMIN — INSULIN LISPRO 2 UNITS: 100 INJECTION, SOLUTION INTRAVENOUS; SUBCUTANEOUS at 18:49

## 2017-04-04 RX ADMIN — INSULIN GLARGINE 10 UNITS: 100 INJECTION, SOLUTION SUBCUTANEOUS at 22:06

## 2017-04-04 RX ADMIN — EXTENDED PHENYTOIN SODIUM 100 MG: 100 CAPSULE ORAL at 14:41

## 2017-04-04 RX ADMIN — AMIODARONE HYDROCHLORIDE 200 MG: 200 TABLET ORAL at 21:27

## 2017-04-04 RX ADMIN — SENNA 8.6 MG: 8.6 TABLET, COATED ORAL at 21:27

## 2017-04-04 RX ADMIN — METOPROLOL TARTRATE 100 MG: 50 TABLET, FILM COATED ORAL at 10:52

## 2017-04-04 RX ADMIN — FOLIC ACID 1 MG: 1 TABLET ORAL at 10:54

## 2017-04-04 RX ADMIN — POTASSIUM CHLORIDE 10 MEQ: 7.46 INJECTION, SOLUTION INTRAVENOUS at 10:22

## 2017-04-04 RX ADMIN — AMIODARONE HYDROCHLORIDE 200 MG: 200 TABLET ORAL at 10:52

## 2017-04-04 RX ADMIN — METOPROLOL TARTRATE 100 MG: 50 TABLET, FILM COATED ORAL at 21:27

## 2017-04-04 RX ADMIN — AMLODIPINE BESYLATE 10 MG: 10 TABLET ORAL at 11:01

## 2017-04-04 RX ADMIN — DOCUSATE SODIUM 100 MG: 50 LIQUID ORAL at 10:54

## 2017-04-04 RX ADMIN — CEPHALEXIN 500 MG: 500 CAPSULE ORAL at 15:18

## 2017-04-04 RX ADMIN — Medication 100 MG: at 10:54

## 2017-04-04 RX ADMIN — ATORVASTATIN CALCIUM 40 MG: 40 TABLET, FILM COATED ORAL at 21:27

## 2017-04-04 RX ADMIN — SODIUM CHLORIDE 1000 ML: 9 INJECTION, SOLUTION INTRAVENOUS at 10:51

## 2017-04-04 RX ADMIN — CEPHALEXIN 500 MG: 500 CAPSULE ORAL at 02:18

## 2017-04-04 RX ADMIN — INSULIN LISPRO 2 UNITS: 100 INJECTION, SOLUTION INTRAVENOUS; SUBCUTANEOUS at 00:09

## 2017-04-04 RX ADMIN — LEVETIRACETAM 500 MG: 500 TABLET, FILM COATED ORAL at 21:27

## 2017-04-04 RX ADMIN — EXTENDED PHENYTOIN SODIUM 100 MG: 100 CAPSULE ORAL at 10:58

## 2017-04-04 RX ADMIN — ASPIRIN 81 MG: 81 TABLET, CHEWABLE ORAL at 10:53

## 2017-04-04 RX ADMIN — Medication 10 ML: at 22:40

## 2017-04-04 RX ADMIN — CEPHALEXIN 500 MG: 500 CAPSULE ORAL at 10:53

## 2017-04-04 RX ADMIN — POTASSIUM CHLORIDE 10 MEQ: 7.45 INJECTION INTRAVENOUS at 10:22

## 2017-04-04 RX ADMIN — GUAIFENESIN 600 MG: 600 TABLET, EXTENDED RELEASE ORAL at 21:27

## 2017-04-04 RX ADMIN — CEPHALEXIN 500 MG: 500 CAPSULE ORAL at 03:46

## 2017-04-04 RX ADMIN — LISINOPRIL 5 MG: 5 TABLET ORAL at 10:53

## 2017-04-04 RX ADMIN — METOPROLOL TARTRATE 5 MG: 5 INJECTION INTRAVENOUS at 03:46

## 2017-04-04 RX ADMIN — HYDRALAZINE HYDROCHLORIDE 10 MG: 20 INJECTION INTRAMUSCULAR; INTRAVENOUS at 00:39

## 2017-04-04 RX ADMIN — INSULIN GLARGINE 20 UNITS: 100 INJECTION, SOLUTION SUBCUTANEOUS at 00:10

## 2017-04-04 RX ADMIN — LEVETIRACETAM 500 MG: 500 TABLET, FILM COATED ORAL at 10:54

## 2017-04-04 RX ADMIN — TAMSULOSIN HYDROCHLORIDE 0.4 MG: 0.4 CAPSULE ORAL at 11:13

## 2017-04-04 RX ADMIN — ENOXAPARIN SODIUM 40 MG: 40 INJECTION SUBCUTANEOUS at 10:56

## 2017-04-04 ASSESSMENT — PAIN SCALES - GENERAL
PAINLEVEL_OUTOF10: 0
PAINLEVEL_OUTOF10: 0
PAINLEVEL_OUTOF10: 8

## 2017-04-04 ASSESSMENT — PAIN DESCRIPTION - LOCATION: LOCATION: ARM

## 2017-04-04 ASSESSMENT — PAIN DESCRIPTION - ORIENTATION: ORIENTATION: LEFT

## 2017-04-04 ASSESSMENT — PAIN DESCRIPTION - PAIN TYPE: TYPE: ACUTE PAIN

## 2017-04-05 VITALS
TEMPERATURE: 98.6 F | RESPIRATION RATE: 16 BRPM | BODY MASS INDEX: 22.84 KG/M2 | HEART RATE: 58 BPM | HEIGHT: 72 IN | WEIGHT: 168.65 LBS | DIASTOLIC BLOOD PRESSURE: 67 MMHG | OXYGEN SATURATION: 98 % | SYSTOLIC BLOOD PRESSURE: 116 MMHG

## 2017-04-05 LAB
C-REACTIVE PROTEIN: 29.8 MG/L (ref 0–5)
GLUCOSE BLD-MCNC: 100 MG/DL (ref 75–110)
GLUCOSE BLD-MCNC: 216 MG/DL (ref 75–110)

## 2017-04-05 PROCEDURE — 6370000000 HC RX 637 (ALT 250 FOR IP): Performed by: INTERNAL MEDICINE

## 2017-04-05 PROCEDURE — 76937 US GUIDE VASCULAR ACCESS: CPT

## 2017-04-05 PROCEDURE — 97116 GAIT TRAINING THERAPY: CPT

## 2017-04-05 PROCEDURE — 94762 N-INVAS EAR/PLS OXIMTRY CONT: CPT

## 2017-04-05 PROCEDURE — 86140 C-REACTIVE PROTEIN: CPT

## 2017-04-05 PROCEDURE — 36415 COLL VENOUS BLD VENIPUNCTURE: CPT

## 2017-04-05 PROCEDURE — 97110 THERAPEUTIC EXERCISES: CPT

## 2017-04-05 PROCEDURE — 6360000002 HC RX W HCPCS: Performed by: INTERNAL MEDICINE

## 2017-04-05 PROCEDURE — 82947 ASSAY GLUCOSE BLOOD QUANT: CPT

## 2017-04-05 PROCEDURE — 99239 HOSP IP/OBS DSCHRG MGMT >30: CPT | Performed by: INTERNAL MEDICINE

## 2017-04-05 RX ORDER — HYDROCHLOROTHIAZIDE 25 MG/1
25 TABLET ORAL DAILY
Status: DISCONTINUED | OUTPATIENT
Start: 2017-04-05 | End: 2017-04-05 | Stop reason: HOSPADM

## 2017-04-05 RX ORDER — LANOLIN ALCOHOL/MO/W.PET/CERES
100 CREAM (GRAM) TOPICAL DAILY
Qty: 30 TABLET | Refills: 3 | Status: SHIPPED | OUTPATIENT
Start: 2017-04-05 | End: 2017-07-26 | Stop reason: ALTCHOICE

## 2017-04-05 RX ORDER — AMIODARONE HYDROCHLORIDE 200 MG/1
200 TABLET ORAL 2 TIMES DAILY
Qty: 30 TABLET | Refills: 3 | Status: SHIPPED | OUTPATIENT
Start: 2017-04-05 | End: 2017-06-30 | Stop reason: SDUPTHER

## 2017-04-05 RX ORDER — LISINOPRIL 10 MG/1
20 TABLET ORAL DAILY
Qty: 30 TABLET | Refills: 3 | Status: SHIPPED | OUTPATIENT
Start: 2017-04-05 | End: 2017-07-26 | Stop reason: SDUPTHER

## 2017-04-05 RX ORDER — GUAIFENESIN 600 MG/1
600 TABLET, EXTENDED RELEASE ORAL 2 TIMES DAILY
Qty: 40 TABLET | Refills: 0 | Status: SHIPPED | OUTPATIENT
Start: 2017-04-05 | End: 2017-04-27 | Stop reason: ALTCHOICE

## 2017-04-05 RX ORDER — METOPROLOL TARTRATE 100 MG/1
100 TABLET ORAL 2 TIMES DAILY
Qty: 60 TABLET | Refills: 3 | Status: SHIPPED | OUTPATIENT
Start: 2017-04-05 | End: 2017-06-30 | Stop reason: SDUPTHER

## 2017-04-05 RX ORDER — CEPHALEXIN 500 MG/1
500 CAPSULE ORAL 4 TIMES DAILY
Qty: 5 CAPSULE | Refills: 0 | Status: SHIPPED | OUTPATIENT
Start: 2017-04-05 | End: 2017-04-06

## 2017-04-05 RX ORDER — FOLIC ACID 1 MG/1
1 TABLET ORAL DAILY
Qty: 30 TABLET | Refills: 3 | Status: SHIPPED | OUTPATIENT
Start: 2017-04-05 | End: 2017-07-26

## 2017-04-05 RX ORDER — PHENYTOIN SODIUM 100 MG/1
100 CAPSULE, EXTENDED RELEASE ORAL 3 TIMES DAILY
Qty: 60 CAPSULE | Refills: 3 | Status: SHIPPED | OUTPATIENT
Start: 2017-04-05 | End: 2017-07-26 | Stop reason: SDUPTHER

## 2017-04-05 RX ORDER — LISINOPRIL 10 MG/1
10 TABLET ORAL DAILY
Status: DISCONTINUED | OUTPATIENT
Start: 2017-04-05 | End: 2017-04-05 | Stop reason: HOSPADM

## 2017-04-05 RX ORDER — AMLODIPINE BESYLATE 10 MG/1
10 TABLET ORAL DAILY
Qty: 30 TABLET | Refills: 3 | Status: SHIPPED | OUTPATIENT
Start: 2017-04-05 | End: 2017-06-30 | Stop reason: SDUPTHER

## 2017-04-05 RX ORDER — ATORVASTATIN CALCIUM 40 MG/1
40 TABLET, FILM COATED ORAL NIGHTLY
Qty: 30 TABLET | Refills: 3 | Status: SHIPPED | OUTPATIENT
Start: 2017-04-05 | End: 2017-07-26 | Stop reason: CLARIF

## 2017-04-05 RX ORDER — INSULIN GLARGINE 100 [IU]/ML
12 INJECTION, SOLUTION SUBCUTANEOUS NIGHTLY
Status: DISCONTINUED | OUTPATIENT
Start: 2017-04-05 | End: 2017-04-05 | Stop reason: HOSPADM

## 2017-04-05 RX ORDER — LEVETIRACETAM 500 MG/1
500 TABLET ORAL 2 TIMES DAILY
Qty: 60 TABLET | Refills: 3 | Status: SHIPPED | OUTPATIENT
Start: 2017-04-05 | End: 2017-06-30 | Stop reason: SDUPTHER

## 2017-04-05 RX ORDER — TAMSULOSIN HYDROCHLORIDE 0.4 MG/1
0.4 CAPSULE ORAL DAILY
Qty: 30 CAPSULE | Refills: 3 | Status: SHIPPED | OUTPATIENT
Start: 2017-04-05 | End: 2017-06-30 | Stop reason: SDUPTHER

## 2017-04-05 RX ADMIN — DOCUSATE SODIUM 100 MG: 50 LIQUID ORAL at 08:42

## 2017-04-05 RX ADMIN — LEVETIRACETAM 500 MG: 500 TABLET, FILM COATED ORAL at 08:42

## 2017-04-05 RX ADMIN — LANSOPRAZOLE 30 MG: 30 TABLET, ORALLY DISINTEGRATING, DELAYED RELEASE ORAL at 06:50

## 2017-04-05 RX ADMIN — TAMSULOSIN HYDROCHLORIDE 0.4 MG: 0.4 CAPSULE ORAL at 08:41

## 2017-04-05 RX ADMIN — METOPROLOL TARTRATE 100 MG: 50 TABLET, FILM COATED ORAL at 08:41

## 2017-04-05 RX ADMIN — GUAIFENESIN 600 MG: 600 TABLET, EXTENDED RELEASE ORAL at 08:41

## 2017-04-05 RX ADMIN — ASPIRIN 81 MG: 81 TABLET, CHEWABLE ORAL at 08:41

## 2017-04-05 RX ADMIN — LISINOPRIL 5 MG: 5 TABLET ORAL at 08:41

## 2017-04-05 RX ADMIN — Medication 100 MG: at 08:41

## 2017-04-05 RX ADMIN — FOLIC ACID 1 MG: 1 TABLET ORAL at 08:41

## 2017-04-05 RX ADMIN — HYDROCHLOROTHIAZIDE 25 MG: 25 TABLET ORAL at 10:18

## 2017-04-05 RX ADMIN — EXTENDED PHENYTOIN SODIUM 100 MG: 100 CAPSULE ORAL at 08:41

## 2017-04-05 RX ADMIN — CEPHALEXIN 500 MG: 500 CAPSULE ORAL at 08:43

## 2017-04-05 RX ADMIN — LISINOPRIL 10 MG: 10 TABLET ORAL at 10:18

## 2017-04-05 RX ADMIN — CEPHALEXIN 500 MG: 500 CAPSULE ORAL at 04:05

## 2017-04-05 RX ADMIN — EXTENDED PHENYTOIN SODIUM 100 MG: 100 CAPSULE ORAL at 13:06

## 2017-04-05 RX ADMIN — AMLODIPINE BESYLATE 10 MG: 10 TABLET ORAL at 08:41

## 2017-04-05 RX ADMIN — INSULIN LISPRO 4 UNITS: 100 INJECTION, SOLUTION INTRAVENOUS; SUBCUTANEOUS at 13:03

## 2017-04-05 RX ADMIN — AMIODARONE HYDROCHLORIDE 200 MG: 200 TABLET ORAL at 08:42

## 2017-04-05 RX ADMIN — CEPHALEXIN 500 MG: 500 CAPSULE ORAL at 15:16

## 2017-04-05 ASSESSMENT — PAIN SCALES - GENERAL
PAINLEVEL_OUTOF10: 0
PAINLEVEL_OUTOF10: 0

## 2017-04-06 ENCOUNTER — CARE COORDINATION (OUTPATIENT)
Dept: INTERNAL MEDICINE CLINIC | Age: 72
End: 2017-04-06

## 2017-04-11 ENCOUNTER — HOSPITAL ENCOUNTER (OUTPATIENT)
Age: 72
Setting detail: SPECIMEN
Discharge: HOME | End: 2017-04-11
Payer: MEDICARE

## 2017-04-11 LAB
ANION GAP SERPL CALCULATED.3IONS-SCNC: 16 MMOL/L (ref 9–17)
BUN BLDV-MCNC: 10 MG/DL (ref 8–23)
BUN/CREAT BLD: 12 (ref 9–20)
CALCIUM SERPL-MCNC: 9.2 MG/DL (ref 8.6–10.4)
CHLORIDE BLD-SCNC: 97 MMOL/L (ref 98–107)
CO2: 24 MMOL/L (ref 20–31)
CREAT SERPL-MCNC: 0.84 MG/DL (ref 0.7–1.2)
GFR AFRICAN AMERICAN: >60 ML/MIN
GFR NON-AFRICAN AMERICAN: >60 ML/MIN
GFR SERPL CREATININE-BSD FRML MDRD: ABNORMAL ML/MIN/{1.73_M2}
GFR SERPL CREATININE-BSD FRML MDRD: ABNORMAL ML/MIN/{1.73_M2}
GLUCOSE BLD-MCNC: 308 MG/DL (ref 70–99)
POTASSIUM SERPL-SCNC: 4.2 MMOL/L (ref 3.7–5.3)
SODIUM BLD-SCNC: 137 MMOL/L (ref 135–144)

## 2017-04-11 PROCEDURE — 36415 COLL VENOUS BLD VENIPUNCTURE: CPT

## 2017-04-11 PROCEDURE — 80048 BASIC METABOLIC PNL TOTAL CA: CPT

## 2017-04-12 PROBLEM — K21.9 GASTROESOPHAGEAL REFLUX DISEASE WITHOUT ESOPHAGITIS: Status: ACTIVE | Noted: 2017-04-12

## 2017-04-15 ENCOUNTER — HOSPITAL ENCOUNTER (OUTPATIENT)
Age: 72
Setting detail: SPECIMEN
Discharge: HOME | End: 2017-04-15
Payer: MEDICARE

## 2017-04-17 ENCOUNTER — PROCEDURE VISIT (OUTPATIENT)
Dept: UROLOGY | Age: 72
End: 2017-04-17
Payer: MEDICARE

## 2017-04-17 VITALS
BODY MASS INDEX: 22.84 KG/M2 | RESPIRATION RATE: 16 BRPM | DIASTOLIC BLOOD PRESSURE: 74 MMHG | HEART RATE: 80 BPM | SYSTOLIC BLOOD PRESSURE: 136 MMHG | WEIGHT: 168.65 LBS | HEIGHT: 72 IN

## 2017-04-17 DIAGNOSIS — R39.15 URINARY URGENCY: ICD-10-CM

## 2017-04-17 DIAGNOSIS — R35.0 FREQUENCY OF URINATION: ICD-10-CM

## 2017-04-17 DIAGNOSIS — R33.9 URINARY RETENTION: Primary | ICD-10-CM

## 2017-04-17 DIAGNOSIS — R35.1 NOCTURIA: ICD-10-CM

## 2017-04-17 PROCEDURE — 1123F ACP DISCUSS/DSCN MKR DOCD: CPT | Performed by: NURSE PRACTITIONER

## 2017-04-17 PROCEDURE — G8598 ASA/ANTIPLAT THER USED: HCPCS | Performed by: NURSE PRACTITIONER

## 2017-04-17 PROCEDURE — 4040F PNEUMOC VAC/ADMIN/RCVD: CPT | Performed by: NURSE PRACTITIONER

## 2017-04-17 PROCEDURE — G8419 CALC BMI OUT NRM PARAM NOF/U: HCPCS | Performed by: NURSE PRACTITIONER

## 2017-04-17 PROCEDURE — 51700 IRRIGATION OF BLADDER: CPT | Performed by: NURSE PRACTITIONER

## 2017-04-17 PROCEDURE — G8427 DOCREV CUR MEDS BY ELIG CLIN: HCPCS | Performed by: NURSE PRACTITIONER

## 2017-04-17 PROCEDURE — 3017F COLORECTAL CA SCREEN DOC REV: CPT | Performed by: NURSE PRACTITIONER

## 2017-04-17 PROCEDURE — 1111F DSCHRG MED/CURRENT MED MERGE: CPT | Performed by: NURSE PRACTITIONER

## 2017-04-17 PROCEDURE — 1036F TOBACCO NON-USER: CPT | Performed by: NURSE PRACTITIONER

## 2017-04-17 PROCEDURE — 99214 OFFICE O/P EST MOD 30 MIN: CPT | Performed by: NURSE PRACTITIONER

## 2017-04-17 ASSESSMENT — ENCOUNTER SYMPTOMS
EYE REDNESS: 0
COLOR CHANGE: 0
VOMITING: 0
NAUSEA: 0
EYE PAIN: 0
SHORTNESS OF BREATH: 0
BACK PAIN: 0
ABDOMINAL PAIN: 0
COUGH: 0
WHEEZING: 0

## 2017-04-19 ENCOUNTER — CARE COORDINATION (OUTPATIENT)
Dept: INTERNAL MEDICINE CLINIC | Age: 72
End: 2017-04-19

## 2017-04-24 ENCOUNTER — CARE COORDINATION (OUTPATIENT)
Dept: CARE COORDINATION | Age: 72
End: 2017-04-24

## 2017-04-24 DIAGNOSIS — M48.02 STENOSIS OF CERVICAL SPINE: ICD-10-CM

## 2017-04-24 PROBLEM — J69.0 ASPIRATION PNEUMONIA (HCC): Status: ACTIVE | Noted: 2017-03-21

## 2017-04-24 PROBLEM — J15.211 MSSA (METHICILLIN SUSCEPTIBLE STAPHYLOCOCCUS AUREUS) PNEUMONIA (HCC): Status: ACTIVE | Noted: 2017-03-21

## 2017-04-25 ENCOUNTER — HOSPITAL ENCOUNTER (OUTPATIENT)
Dept: GENERAL RADIOLOGY | Age: 72
Discharge: HOME | End: 2017-04-25
Payer: MEDICARE

## 2017-04-25 ENCOUNTER — HOSPITAL ENCOUNTER (OUTPATIENT)
Age: 72
Discharge: HOME | End: 2017-04-25
Payer: MEDICARE

## 2017-04-25 DIAGNOSIS — R52 PAIN: ICD-10-CM

## 2017-04-25 PROCEDURE — 73090 X-RAY EXAM OF FOREARM: CPT

## 2017-04-27 ENCOUNTER — OFFICE VISIT (OUTPATIENT)
Dept: INTERNAL MEDICINE CLINIC | Age: 72
End: 2017-04-27
Payer: MEDICARE

## 2017-04-27 VITALS
DIASTOLIC BLOOD PRESSURE: 84 MMHG | SYSTOLIC BLOOD PRESSURE: 136 MMHG | BODY MASS INDEX: 20.32 KG/M2 | HEIGHT: 72 IN | WEIGHT: 150 LBS

## 2017-04-27 DIAGNOSIS — G40.309 NONINTRACTABLE GENERALIZED IDIOPATHIC EPILEPSY WITHOUT STATUS EPILEPTICUS (HCC): Primary | ICD-10-CM

## 2017-04-27 DIAGNOSIS — A41.9 SEPSIS, DUE TO UNSPECIFIED ORGANISM: ICD-10-CM

## 2017-04-27 DIAGNOSIS — I10 ESSENTIAL HYPERTENSION, BENIGN: ICD-10-CM

## 2017-04-27 DIAGNOSIS — J69.0 ASPIRATION PNEUMONIA, UNSPECIFIED ASPIRATION PNEUMONIA TYPE, UNSPECIFIED LATERALITY, UNSPECIFIED PART OF LUNG (HCC): ICD-10-CM

## 2017-04-27 PROCEDURE — 99496 TRANSJ CARE MGMT HIGH F2F 7D: CPT | Performed by: INTERNAL MEDICINE

## 2017-04-27 RX ORDER — ESCITALOPRAM OXALATE 10 MG/1
TABLET ORAL
COMMUNITY
Start: 2017-04-24 | End: 2017-06-30 | Stop reason: SDUPTHER

## 2017-04-27 RX ORDER — LISINOPRIL 20 MG/1
TABLET ORAL
COMMUNITY
Start: 2017-04-21 | End: 2017-04-27 | Stop reason: ALTCHOICE

## 2017-05-02 ENCOUNTER — OFFICE VISIT (OUTPATIENT)
Dept: UROLOGY | Age: 72
End: 2017-05-02
Payer: MEDICARE

## 2017-05-02 VITALS
WEIGHT: 150 LBS | DIASTOLIC BLOOD PRESSURE: 80 MMHG | TEMPERATURE: 97.6 F | HEIGHT: 72 IN | HEART RATE: 66 BPM | SYSTOLIC BLOOD PRESSURE: 159 MMHG | BODY MASS INDEX: 20.32 KG/M2

## 2017-05-02 DIAGNOSIS — R33.9 INCOMPLETE EMPTYING OF BLADDER: Primary | ICD-10-CM

## 2017-05-02 DIAGNOSIS — R35.1 NOCTURIA: ICD-10-CM

## 2017-05-02 DIAGNOSIS — R33.9 URINARY RETENTION: ICD-10-CM

## 2017-05-02 DIAGNOSIS — R35.0 FREQUENCY OF URINATION: ICD-10-CM

## 2017-05-02 DIAGNOSIS — R39.15 URINARY URGENCY: ICD-10-CM

## 2017-05-02 PROCEDURE — G8419 CALC BMI OUT NRM PARAM NOF/U: HCPCS | Performed by: NURSE PRACTITIONER

## 2017-05-02 PROCEDURE — 4040F PNEUMOC VAC/ADMIN/RCVD: CPT | Performed by: NURSE PRACTITIONER

## 2017-05-02 PROCEDURE — 1111F DSCHRG MED/CURRENT MED MERGE: CPT | Performed by: NURSE PRACTITIONER

## 2017-05-02 PROCEDURE — G8598 ASA/ANTIPLAT THER USED: HCPCS | Performed by: NURSE PRACTITIONER

## 2017-05-02 PROCEDURE — 99214 OFFICE O/P EST MOD 30 MIN: CPT | Performed by: NURSE PRACTITIONER

## 2017-05-02 PROCEDURE — 1036F TOBACCO NON-USER: CPT | Performed by: NURSE PRACTITIONER

## 2017-05-02 PROCEDURE — 51798 US URINE CAPACITY MEASURE: CPT | Performed by: NURSE PRACTITIONER

## 2017-05-02 PROCEDURE — G8427 DOCREV CUR MEDS BY ELIG CLIN: HCPCS | Performed by: NURSE PRACTITIONER

## 2017-05-02 PROCEDURE — 3017F COLORECTAL CA SCREEN DOC REV: CPT | Performed by: NURSE PRACTITIONER

## 2017-05-02 PROCEDURE — 1123F ACP DISCUSS/DSCN MKR DOCD: CPT | Performed by: NURSE PRACTITIONER

## 2017-05-02 ASSESSMENT — ENCOUNTER SYMPTOMS
NAUSEA: 0
ABDOMINAL PAIN: 0
BACK PAIN: 1
EYE REDNESS: 0
COLOR CHANGE: 0
SHORTNESS OF BREATH: 0
VOMITING: 0
COUGH: 0
WHEEZING: 0
EYE PAIN: 0

## 2017-05-08 ENCOUNTER — APPOINTMENT (OUTPATIENT)
Dept: CT IMAGING | Age: 72
End: 2017-05-08
Payer: MEDICARE

## 2017-05-08 ENCOUNTER — HOSPITAL ENCOUNTER (EMERGENCY)
Age: 72
Discharge: HOME | End: 2017-05-08
Attending: EMERGENCY MEDICINE
Payer: MEDICARE

## 2017-05-08 VITALS
HEIGHT: 72 IN | BODY MASS INDEX: 20.32 KG/M2 | TEMPERATURE: 97.7 F | RESPIRATION RATE: 20 BRPM | OXYGEN SATURATION: 100 % | SYSTOLIC BLOOD PRESSURE: 163 MMHG | HEART RATE: 66 BPM | WEIGHT: 150 LBS | DIASTOLIC BLOOD PRESSURE: 86 MMHG

## 2017-05-08 DIAGNOSIS — S02.2XXB OPEN FRACTURE OF NASAL BONE, INITIAL ENCOUNTER: Primary | ICD-10-CM

## 2017-05-08 PROCEDURE — 6370000000 HC RX 637 (ALT 250 FOR IP): Performed by: PHYSICIAN ASSISTANT

## 2017-05-08 PROCEDURE — 99284 EMERGENCY DEPT VISIT MOD MDM: CPT

## 2017-05-08 PROCEDURE — 12011 RPR F/E/E/N/L/M 2.5 CM/<: CPT

## 2017-05-08 PROCEDURE — 70486 CT MAXILLOFACIAL W/O DYE: CPT

## 2017-05-08 PROCEDURE — 70450 CT HEAD/BRAIN W/O DYE: CPT

## 2017-05-08 RX ORDER — CEPHALEXIN 500 MG/1
500 CAPSULE ORAL 4 TIMES DAILY
Qty: 28 CAPSULE | Refills: 0 | Status: SHIPPED | OUTPATIENT
Start: 2017-05-08 | End: 2017-05-15

## 2017-05-08 RX ORDER — ACETAMINOPHEN 500 MG
1000 TABLET ORAL ONCE
Status: COMPLETED | OUTPATIENT
Start: 2017-05-08 | End: 2017-05-08

## 2017-05-08 RX ADMIN — ACETAMINOPHEN 1000 MG: 500 TABLET ORAL at 17:17

## 2017-05-08 ASSESSMENT — PAIN SCALES - GENERAL: PAINLEVEL_OUTOF10: 7

## 2017-05-10 ENCOUNTER — CARE COORDINATION (OUTPATIENT)
Dept: CARE COORDINATION | Age: 72
End: 2017-05-10

## 2017-05-30 ENCOUNTER — HOSPITAL ENCOUNTER (EMERGENCY)
Age: 72
Discharge: HOME | End: 2017-05-30
Attending: EMERGENCY MEDICINE
Payer: MEDICARE

## 2017-05-30 VITALS
RESPIRATION RATE: 16 BRPM | WEIGHT: 150 LBS | BODY MASS INDEX: 20.32 KG/M2 | HEART RATE: 73 BPM | OXYGEN SATURATION: 99 % | DIASTOLIC BLOOD PRESSURE: 74 MMHG | HEIGHT: 72 IN | SYSTOLIC BLOOD PRESSURE: 126 MMHG | TEMPERATURE: 98.6 F

## 2017-05-30 DIAGNOSIS — Z48.02 VISIT FOR SUTURE REMOVAL: Primary | ICD-10-CM

## 2017-05-30 PROCEDURE — 99281 EMR DPT VST MAYX REQ PHY/QHP: CPT

## 2017-05-30 RX ORDER — CEPHALEXIN 500 MG/1
500 CAPSULE ORAL 4 TIMES DAILY
Qty: 28 CAPSULE | Refills: 0 | Status: SHIPPED | OUTPATIENT
Start: 2017-05-30 | End: 2017-06-06

## 2017-06-19 ENCOUNTER — TELEPHONE (OUTPATIENT)
Dept: INTERNAL MEDICINE CLINIC | Age: 72
End: 2017-06-19

## 2017-06-19 DIAGNOSIS — I10 ESSENTIAL HYPERTENSION: ICD-10-CM

## 2017-06-19 RX ORDER — ATENOLOL 50 MG/1
50 TABLET ORAL DAILY
Qty: 90 TABLET | Refills: 1 | Status: SHIPPED | OUTPATIENT
Start: 2017-06-19 | End: 2017-07-26 | Stop reason: ALTCHOICE

## 2017-06-22 DIAGNOSIS — E13.8 OTHER SPECIFIED DIABETES MELLITUS WITH UNSPECIFIED COMPLICATIONS (HCC): ICD-10-CM

## 2017-06-23 ENCOUNTER — TELEPHONE (OUTPATIENT)
Dept: INTERNAL MEDICINE CLINIC | Age: 72
End: 2017-06-23

## 2017-06-30 RX ORDER — AMLODIPINE BESYLATE 10 MG/1
10 TABLET ORAL DAILY
Qty: 30 TABLET | Refills: 3 | Status: SHIPPED | OUTPATIENT
Start: 2017-06-30 | End: 2017-07-06 | Stop reason: SDUPTHER

## 2017-06-30 RX ORDER — ESCITALOPRAM OXALATE 10 MG/1
10 TABLET ORAL DAILY
Qty: 30 TABLET | Refills: 1 | Status: SHIPPED | OUTPATIENT
Start: 2017-06-30 | End: 2017-07-26 | Stop reason: SDUPTHER

## 2017-06-30 RX ORDER — AMIODARONE HYDROCHLORIDE 200 MG/1
200 TABLET ORAL 2 TIMES DAILY
Qty: 30 TABLET | Refills: 3 | Status: SHIPPED | OUTPATIENT
Start: 2017-06-30 | End: 2017-10-13 | Stop reason: SDUPTHER

## 2017-06-30 RX ORDER — TAMSULOSIN HYDROCHLORIDE 0.4 MG/1
0.4 CAPSULE ORAL DAILY
Qty: 30 CAPSULE | Refills: 3 | Status: SHIPPED | OUTPATIENT
Start: 2017-06-30 | End: 2017-07-06 | Stop reason: SDUPTHER

## 2017-06-30 RX ORDER — LEVETIRACETAM 500 MG/1
500 TABLET ORAL 2 TIMES DAILY
Qty: 60 TABLET | Refills: 3 | Status: SHIPPED | OUTPATIENT
Start: 2017-06-30 | End: 2017-07-06 | Stop reason: SDUPTHER

## 2017-06-30 RX ORDER — METOPROLOL TARTRATE 100 MG/1
100 TABLET ORAL 2 TIMES DAILY
Qty: 60 TABLET | Refills: 3 | Status: SHIPPED | OUTPATIENT
Start: 2017-06-30 | End: 2017-09-13

## 2017-07-05 ENCOUNTER — OFFICE VISIT (OUTPATIENT)
Dept: INTERNAL MEDICINE CLINIC | Age: 72
End: 2017-07-05
Payer: MEDICARE

## 2017-07-05 VITALS
HEIGHT: 72 IN | DIASTOLIC BLOOD PRESSURE: 76 MMHG | WEIGHT: 142 LBS | SYSTOLIC BLOOD PRESSURE: 142 MMHG | BODY MASS INDEX: 19.23 KG/M2

## 2017-07-05 DIAGNOSIS — Z23 NEED FOR VACCINATION: ICD-10-CM

## 2017-07-05 DIAGNOSIS — G40.901 STATUS EPILEPTICUS (HCC): Primary | ICD-10-CM

## 2017-07-05 DIAGNOSIS — R25.1 INVOLUNTARY TREMBLING: ICD-10-CM

## 2017-07-05 DIAGNOSIS — Z00.00 PREVENTATIVE HEALTH CARE: ICD-10-CM

## 2017-07-05 DIAGNOSIS — R42 DIZZINESS: ICD-10-CM

## 2017-07-05 PROCEDURE — 1036F TOBACCO NON-USER: CPT | Performed by: NURSE PRACTITIONER

## 2017-07-05 PROCEDURE — G8420 CALC BMI NORM PARAMETERS: HCPCS | Performed by: NURSE PRACTITIONER

## 2017-07-05 PROCEDURE — 1123F ACP DISCUSS/DSCN MKR DOCD: CPT | Performed by: NURSE PRACTITIONER

## 2017-07-05 PROCEDURE — 99213 OFFICE O/P EST LOW 20 MIN: CPT | Performed by: NURSE PRACTITIONER

## 2017-07-05 PROCEDURE — G8598 ASA/ANTIPLAT THER USED: HCPCS | Performed by: NURSE PRACTITIONER

## 2017-07-05 PROCEDURE — 4040F PNEUMOC VAC/ADMIN/RCVD: CPT | Performed by: NURSE PRACTITIONER

## 2017-07-05 PROCEDURE — 90670 PCV13 VACCINE IM: CPT | Performed by: NURSE PRACTITIONER

## 2017-07-05 PROCEDURE — G0009 ADMIN PNEUMOCOCCAL VACCINE: HCPCS | Performed by: NURSE PRACTITIONER

## 2017-07-05 PROCEDURE — 3017F COLORECTAL CA SCREEN DOC REV: CPT | Performed by: NURSE PRACTITIONER

## 2017-07-05 PROCEDURE — G8427 DOCREV CUR MEDS BY ELIG CLIN: HCPCS | Performed by: NURSE PRACTITIONER

## 2017-07-05 RX ORDER — SIMVASTATIN 10 MG
10 TABLET ORAL NIGHTLY
COMMUNITY
Start: 2017-06-16 | End: 2018-02-21 | Stop reason: SDUPTHER

## 2017-07-05 RX ORDER — MELOXICAM 15 MG/1
TABLET ORAL
COMMUNITY
Start: 2017-06-16 | End: 2017-07-26

## 2017-07-05 RX ORDER — MECLIZINE HYDROCHLORIDE 25 MG/1
25 TABLET ORAL 3 TIMES DAILY PRN
Qty: 42 TABLET | Refills: 0 | Status: SHIPPED | OUTPATIENT
Start: 2017-07-05 | End: 2017-07-26 | Stop reason: SDUPTHER

## 2017-07-05 ASSESSMENT — ENCOUNTER SYMPTOMS
ABDOMINAL PAIN: 0
COUGH: 0
SHORTNESS OF BREATH: 0
VOICE CHANGE: 1
EYE DISCHARGE: 0
BLOOD IN STOOL: 0

## 2017-07-06 RX ORDER — AMLODIPINE BESYLATE 10 MG/1
10 TABLET ORAL DAILY
Qty: 90 TABLET | Refills: 0 | Status: SHIPPED | OUTPATIENT
Start: 2017-07-06 | End: 2017-07-06 | Stop reason: SDUPTHER

## 2017-07-06 RX ORDER — TAMSULOSIN HYDROCHLORIDE 0.4 MG/1
0.4 CAPSULE ORAL DAILY
Qty: 90 CAPSULE | Refills: 0 | Status: SHIPPED | OUTPATIENT
Start: 2017-07-06 | End: 2017-10-09 | Stop reason: SDUPTHER

## 2017-07-06 RX ORDER — LEVETIRACETAM 500 MG/1
500 TABLET ORAL 2 TIMES DAILY
Qty: 180 TABLET | Refills: 0 | Status: SHIPPED | OUTPATIENT
Start: 2017-07-06 | End: 2017-07-06 | Stop reason: SDUPTHER

## 2017-07-06 RX ORDER — LEVETIRACETAM 500 MG/1
500 TABLET ORAL 2 TIMES DAILY
Qty: 60 TABLET | Refills: 11 | Status: SHIPPED | OUTPATIENT
Start: 2017-07-06 | End: 2017-11-14 | Stop reason: SDUPTHER

## 2017-07-06 RX ORDER — AMLODIPINE BESYLATE 10 MG/1
10 TABLET ORAL DAILY
Qty: 30 TABLET | Refills: 11 | Status: SHIPPED | OUTPATIENT
Start: 2017-07-06 | End: 2017-11-14 | Stop reason: ALTCHOICE

## 2017-07-26 ENCOUNTER — CARE COORDINATION (OUTPATIENT)
Dept: CARE COORDINATION | Age: 72
End: 2017-07-26

## 2017-07-26 RX ORDER — OMEGA-3S/DHA/EPA/FISH OIL/D3 300MG-1000
400 CAPSULE ORAL DAILY
COMMUNITY

## 2017-07-27 RX ORDER — MECLIZINE HYDROCHLORIDE 25 MG/1
25 TABLET ORAL 3 TIMES DAILY PRN
Qty: 90 TABLET | Refills: 3 | Status: SHIPPED | OUTPATIENT
Start: 2017-07-27 | End: 2017-08-26

## 2017-07-27 RX ORDER — PHENYTOIN SODIUM 100 MG/1
100 CAPSULE, EXTENDED RELEASE ORAL 3 TIMES DAILY
Qty: 60 CAPSULE | Refills: 11 | Status: SHIPPED | OUTPATIENT
Start: 2017-07-27 | End: 2017-08-24 | Stop reason: SDUPTHER

## 2017-07-27 RX ORDER — LISINOPRIL 10 MG/1
20 TABLET ORAL DAILY
Qty: 30 TABLET | Refills: 11 | Status: SHIPPED | OUTPATIENT
Start: 2017-07-27 | End: 2017-08-24 | Stop reason: SDUPTHER

## 2017-07-27 RX ORDER — ESCITALOPRAM OXALATE 10 MG/1
10 TABLET ORAL DAILY
Qty: 30 TABLET | Refills: 11 | Status: SHIPPED | OUTPATIENT
Start: 2017-07-27

## 2017-07-28 ENCOUNTER — CARE COORDINATION (OUTPATIENT)
Dept: CARE COORDINATION | Age: 72
End: 2017-07-28

## 2017-08-24 ENCOUNTER — OFFICE VISIT (OUTPATIENT)
Dept: INTERNAL MEDICINE CLINIC | Age: 72
End: 2017-08-24
Payer: MEDICARE

## 2017-08-24 VITALS
DIASTOLIC BLOOD PRESSURE: 78 MMHG | SYSTOLIC BLOOD PRESSURE: 138 MMHG | WEIGHT: 144 LBS | BODY MASS INDEX: 19.5 KG/M2 | HEIGHT: 72 IN

## 2017-08-24 DIAGNOSIS — I25.10 ATHEROSCLEROSIS OF NATIVE CORONARY ARTERY OF NATIVE HEART WITHOUT ANGINA PECTORIS: ICD-10-CM

## 2017-08-24 DIAGNOSIS — I10 ESSENTIAL HYPERTENSION, BENIGN: Primary | ICD-10-CM

## 2017-08-24 DIAGNOSIS — R56.9 SEIZURES (HCC): ICD-10-CM

## 2017-08-24 PROCEDURE — 1123F ACP DISCUSS/DSCN MKR DOCD: CPT | Performed by: INTERNAL MEDICINE

## 2017-08-24 PROCEDURE — G8420 CALC BMI NORM PARAMETERS: HCPCS | Performed by: INTERNAL MEDICINE

## 2017-08-24 PROCEDURE — 99214 OFFICE O/P EST MOD 30 MIN: CPT | Performed by: INTERNAL MEDICINE

## 2017-08-24 PROCEDURE — G8427 DOCREV CUR MEDS BY ELIG CLIN: HCPCS | Performed by: INTERNAL MEDICINE

## 2017-08-24 PROCEDURE — G8598 ASA/ANTIPLAT THER USED: HCPCS | Performed by: INTERNAL MEDICINE

## 2017-08-24 PROCEDURE — 4040F PNEUMOC VAC/ADMIN/RCVD: CPT | Performed by: INTERNAL MEDICINE

## 2017-08-24 PROCEDURE — 1036F TOBACCO NON-USER: CPT | Performed by: INTERNAL MEDICINE

## 2017-08-24 PROCEDURE — 3017F COLORECTAL CA SCREEN DOC REV: CPT | Performed by: INTERNAL MEDICINE

## 2017-08-24 RX ORDER — LISINOPRIL 10 MG/1
20 TABLET ORAL DAILY
Qty: 30 TABLET | Refills: 11 | Status: SHIPPED | OUTPATIENT
Start: 2017-08-24 | End: 2017-08-28 | Stop reason: SDUPTHER

## 2017-08-24 RX ORDER — PHENYTOIN SODIUM 100 MG/1
100 CAPSULE, EXTENDED RELEASE ORAL 3 TIMES DAILY
Qty: 60 CAPSULE | Refills: 11 | Status: SHIPPED | OUTPATIENT
Start: 2017-08-24 | End: 2017-11-14 | Stop reason: ALTCHOICE

## 2017-08-25 ENCOUNTER — OFFICE VISIT (OUTPATIENT)
Dept: NEUROLOGY | Age: 72
End: 2017-08-25
Payer: MEDICARE

## 2017-08-25 VITALS
HEART RATE: 48 BPM | DIASTOLIC BLOOD PRESSURE: 74 MMHG | BODY MASS INDEX: 19.5 KG/M2 | HEIGHT: 72 IN | SYSTOLIC BLOOD PRESSURE: 125 MMHG | WEIGHT: 144 LBS

## 2017-08-25 DIAGNOSIS — R56.9 SEIZURE (HCC): ICD-10-CM

## 2017-08-25 DIAGNOSIS — E11.42 DIABETIC POLYNEUROPATHY ASSOCIATED WITH TYPE 2 DIABETES MELLITUS (HCC): ICD-10-CM

## 2017-08-25 PROCEDURE — G8427 DOCREV CUR MEDS BY ELIG CLIN: HCPCS | Performed by: PSYCHIATRY & NEUROLOGY

## 2017-08-25 PROCEDURE — 3046F HEMOGLOBIN A1C LEVEL >9.0%: CPT | Performed by: PSYCHIATRY & NEUROLOGY

## 2017-08-25 PROCEDURE — G8598 ASA/ANTIPLAT THER USED: HCPCS | Performed by: PSYCHIATRY & NEUROLOGY

## 2017-08-25 PROCEDURE — 3017F COLORECTAL CA SCREEN DOC REV: CPT | Performed by: PSYCHIATRY & NEUROLOGY

## 2017-08-25 PROCEDURE — 1036F TOBACCO NON-USER: CPT | Performed by: PSYCHIATRY & NEUROLOGY

## 2017-08-25 PROCEDURE — G8420 CALC BMI NORM PARAMETERS: HCPCS | Performed by: PSYCHIATRY & NEUROLOGY

## 2017-08-25 PROCEDURE — 99214 OFFICE O/P EST MOD 30 MIN: CPT | Performed by: PSYCHIATRY & NEUROLOGY

## 2017-08-25 PROCEDURE — 1123F ACP DISCUSS/DSCN MKR DOCD: CPT | Performed by: PSYCHIATRY & NEUROLOGY

## 2017-08-25 PROCEDURE — 4040F PNEUMOC VAC/ADMIN/RCVD: CPT | Performed by: PSYCHIATRY & NEUROLOGY

## 2017-08-25 RX ORDER — LEVETIRACETAM 500 MG/1
500 TABLET ORAL 2 TIMES DAILY
Qty: 180 TABLET | Refills: 1 | Status: SHIPPED | OUTPATIENT
Start: 2017-08-25

## 2017-08-28 RX ORDER — LISINOPRIL 10 MG/1
20 TABLET ORAL DAILY
Qty: 90 TABLET | Refills: 3 | Status: SHIPPED | OUTPATIENT
Start: 2017-08-28

## 2017-09-06 ENCOUNTER — TELEPHONE (OUTPATIENT)
Dept: INTERNAL MEDICINE CLINIC | Age: 72
End: 2017-09-06

## 2017-09-12 ENCOUNTER — CARE COORDINATION (OUTPATIENT)
Dept: CARE COORDINATION | Age: 72
End: 2017-09-12

## 2017-09-12 ENCOUNTER — TELEPHONE (OUTPATIENT)
Dept: NEUROLOGY | Age: 72
End: 2017-09-12

## 2017-09-13 RX ORDER — MECLIZINE HYDROCHLORIDE 25 MG/1
25 TABLET ORAL 3 TIMES DAILY PRN
COMMUNITY

## 2017-09-13 RX ORDER — METOPROLOL TARTRATE 100 MG/1
100 TABLET ORAL 2 TIMES DAILY
COMMUNITY

## 2017-10-05 ENCOUNTER — TELEPHONE (OUTPATIENT)
Dept: FAMILY MEDICINE CLINIC | Age: 72
End: 2017-10-05

## 2017-10-05 LAB
BUN BLDV-MCNC: 10 MG/DL
CALCIUM SERPL-MCNC: 9.8 MG/DL
CHLORIDE BLD-SCNC: 104 MMOL/L
CO2: 28 MMOL/L
CREAT SERPL-MCNC: 0.71 MG/DL
GFR CALCULATED: NORMAL
GLUCOSE BLD-MCNC: 107 MG/DL
POTASSIUM SERPL-SCNC: 4.2 MMOL/L
SODIUM BLD-SCNC: 140 MMOL/L

## 2017-10-05 NOTE — TELEPHONE ENCOUNTER
Got a call from [address] [facility] about Low Dilantin Levels , Talk to Patient and Reviewed Neurologist Notes . Patient has  stopped taking Dilantin , per Direction of Neurologist . No seizures in last 3 months .  Advice to continue same

## 2017-10-06 DIAGNOSIS — R56.9 SEIZURES (HCC): ICD-10-CM

## 2017-10-09 RX ORDER — TAMSULOSIN HYDROCHLORIDE 0.4 MG/1
CAPSULE ORAL
Qty: 90 CAPSULE | Refills: 3 | Status: SHIPPED | OUTPATIENT
Start: 2017-10-09

## 2017-10-16 RX ORDER — AMIODARONE HYDROCHLORIDE 200 MG/1
TABLET ORAL
Qty: 30 TABLET | Refills: 11 | Status: SHIPPED | OUTPATIENT
Start: 2017-10-16 | End: 2017-11-14 | Stop reason: ALTCHOICE

## 2017-11-14 ENCOUNTER — CARE COORDINATION (OUTPATIENT)
Dept: CARE COORDINATION | Age: 72
End: 2017-11-14

## 2017-11-14 NOTE — CARE COORDINATION
Ambulatory Care Coordination Note  11/14/2017  CM Risk Score: 9  Lorin Mortality Risk Score: 13.63    ACC: Meron Min RN    Summary Note: spoke to patient who states he has been doing good. States he has a lot of bills to pay since his wife passed away and now he is on fixed income. He stated he has all of his current meds. Reconciled his home meds, he was recently at cardio and neuro and couple meds have been stopped. Discussed his A1C up to 8.7 with him, he said he probably want taking his meds all the time then. He said is taking them daily now. Encouraged him to take meds per order and to be sure to call when he needs a refill. RNCC will reach out to patient in 2-3 weeks for follow up. Encouraged him to call [facility] for any help regarding appointments,medications and questions/concerns with their health management, patient verbalized understanding. [facility] contact information given to patient. Diabetes Assessment    Medic Alert ID:  No  Meal Planning:  Avoidance of concentrated sweets   How often do you test your blood sugar?:  No Testing   Do you have barriers with adherence to non-pharmacologic self-management interventions? (Nutrition/Exercise/Self-Monitoring):  Yes   Have you ever had to go to the ED for symptoms of low blood sugar?:  No       No patient-reported symptoms        Lab Results   Component Value Date    LABA1C 8.7 (H) 04/04/2017    LABA1C 8.3 11/07/2016    LABA1C 7.4 01/07/2016     Lab Results   Component Value Date    LABMICR 81 09/29/2015    LDLCALC 89 02/23/2017    CREATININE 0.71 10/05/2017         Care Coordination Interventions    Program Enrollment:  Rising Risk  Referral from Primary Care Provider:  Yes  Suggested Interventions and Community Resources  No Identified Needs  Fall Risk Prevention:  Completed  Medi Set or Pill Pack:   In Process  Other Services:  Completed (Comment: [facility]  Transportation Support:  Completed  Zone Management Tools:  Completed (Comment: DM zone sheet education)  Other Services or Interventions:  ? need glucometer and supplies. Goals Addressed             Most Recent     Conditions and Symptoms   On track (11/14/2017)             I will schedule office visits, as directed by my provider. I will keep my appointment or reschedule if I have to cancel. I will notify my provider of any barriers to my plan of care. I will follow my Zone Management tool to seek urgent or emergent care. I will notify my provider of any symptoms that indicate a worsening of my condition. Barriers: impairment:  cognitive  Plan for overcoming my barriers: will schedule future appointments while in the office and them write down to help remember. Confidence: 6/10  Anticipated Goal Completion Date: 10/1/17         Medication Management   On track (11/14/2017)             I will take my medication as directed. I will notify my provider of any problems with medications, like adverse effects or side effects. I will notify my provider/Care Coordinator if I am unable to afford my medications. I will notify my provider for advice before I stop taking any of my medication. Barriers: impairment:  cognitive and lack of education  Plan for overcoming my barriers: will keep a list of medications and get a weekly med box to keep them in. Confidence: 6/10  Anticipated Goal Completion Date: 10/1/17            Prior to Admission medications    Medication Sig Start Date End Date Taking?  Authorizing Provider   tamsulosin (FLOMAX) 0.4 MG capsule TAKE 1 CAPSULE DAILY 10/9/17  Yes Yogesh Davila MD   meclizine (ANTIVERT) 25 MG tablet Take 25 mg by mouth 3 times daily as needed for Dizziness   Yes Historical Provider, MD   metoprolol (LOPRESSOR) 100 MG tablet Take 100 mg by mouth 2 times daily   Yes Historical Provider, MD   lisinopril (PRINIVIL;ZESTRIL) 10 MG tablet Take 2 tablets by mouth daily 8/28/17  Yes Yogesh Davila MD   levETIRAcetam (KEPPRA) 500 MG tablet Take 1 tablet by mouth 2 times daily 8/25/17  Yes Henri Haas MD   escitalopram (LEXAPRO) 10 MG tablet Take 1 tablet by mouth daily 7/27/17  Yes Tito Doyle MD   vitamin D3 (CHOLECALCIFEROL) 400 units TABS tablet Take 400 Units by mouth daily   Yes Historical Provider, MD   simvastatin (ZOCOR) 10 MG tablet Take 10 mg by mouth nightly  6/16/17  Yes Historical Provider, MD   metFORMIN (GLUCOPHAGE) 500 MG tablet TAKE 1 TABLET TWICE A DAY (WITH MEALS) 6/26/17  Yes Tito Doyle MD   glimepiride (AMARYL) 2 MG tablet Take 1 tablet by mouth every morning 11/10/16  Yes Tito Doyle MD   alendronate (FOSAMAX) 70 MG tablet Take 1 tablet by mouth every 7 days 9/29/15  Yes Tito Doyle MD   aspirin EC 81 MG EC tablet Take 1 tablet by mouth daily.  12/5/14  Yes Tito Doyle MD       Future Appointments  Date Time Provider Patrizia Goldberg   2/21/2018 1:00 PM MD Gabby Parekh, RN  Ambulatory Care Coordinator

## 2018-01-04 ENCOUNTER — CARE COORDINATION (OUTPATIENT)
Dept: CARE COORDINATION | Age: 73
End: 2018-01-04

## 2018-01-04 NOTE — CARE COORDINATION
Ambulatory Care Coordination Note  1/4/2018  CM Risk Score: 9  Lorin Mortality Risk Score: 13.63    ACC: Jony Degroot RN    Summary Note: Patient is a 71year old male. PMH includes DM with neuropathy, HTN, HLD, Seizure Disorder, Sepsis  and aspiration pneumonia    Patient admits to being depressed and non-compliant with POC. Patient states the depression stems from the death of his spouse, patient's own declining health, and the debt he has accumulated through the healthcare for both he and his wife. Patient and his son live together. Patient's only hobby was collecting and working on motorized vehicles. Claims to have no friends or relatives come to visit. States since hospitalization in March 2017,  patient does \". ..nothing but sit around and watch TV all day long. \"   Denies any suicidal tendencies. Discussed meeting with a Behavioral Health Counsellor. Patient declined. Admits he does not check his glucose levels because he claims to eat one regular meal per day then later in the day he has a bowl of cereal.  Also states \"it just isn't important anymore. \"  States he does take his medications as prescribed with the exception of Fosamax, Antivert and Cholecalciferol which he has stopped. Complains of intermittent dizziness (normally when standing to quickly) and balance issues. Denies any falls since May of 2017. Diabetes Assessment    Medic Alert ID:  No  Meal Planning:  Avoidance of concentrated sweets   How often do you test your blood sugar?:  No Testing   Do you have barriers with adherence to non-pharmacologic self-management interventions?  (Nutrition/Exercise/Self-Monitoring):  Yes   Have you ever had to go to the ED for symptoms of low blood sugar?:  No       Do you have hyperglycemia symptoms?:  No   Do you have hypoglycemia symptoms?:  No   Blood Sugar Monitoring Regimen:  Not Testing          Care Coordination Interventions    Program Enrollment:  Rising Risk  Referral from Primary Care Bertha Hanna MD   metoprolol (LOPRESSOR) 100 MG tablet Take 100 mg by mouth 2 times daily   Yes Historical Provider, MD   levETIRAcetam (KEPPRA) 500 MG tablet Take 1 tablet by mouth 2 times daily 8/25/17  Yes Bea Mccall MD   escitalopram (LEXAPRO) 10 MG tablet Take 1 tablet by mouth daily 7/27/17  Yes Kareen Moore MD   simvastatin (ZOCOR) 10 MG tablet Take 10 mg by mouth nightly  6/16/17  Yes Historical Provider, MD   metFORMIN (GLUCOPHAGE) 500 MG tablet TAKE 1 TABLET TWICE A DAY (WITH MEALS) 6/26/17  Yes Kareen Moore MD   glimepiride (AMARYL) 2 MG tablet Take 1 tablet by mouth every morning 11/10/16  Yes Kareen Moore MD   aspirin EC 81 MG EC tablet Take 1 tablet by mouth daily.  12/5/14  Yes Kareen Moore MD   meclizine (ANTIVERT) 25 MG tablet Take 25 mg by mouth 3 times daily as needed for Dizziness    Historical Provider, MD   lisinopril (PRINIVIL;ZESTRIL) 10 MG tablet Take 2 tablets by mouth daily 8/28/17   Kareen Moore MD   vitamin D3 (CHOLECALCIFEROL) 400 units TABS tablet Take 400 Units by mouth daily    Historical Provider, MD   alendronate (FOSAMAX) 70 MG tablet Take 1 tablet by mouth every 7 days 9/29/15   Kareen Moore MD       Future Appointments  Date Time Provider Patrizia Goldberg   2/21/2018 1:00 PM Kareen Moore MD [facility]

## 2018-01-10 DIAGNOSIS — E78.5 HYPERLIPIDEMIA, UNSPECIFIED HYPERLIPIDEMIA TYPE: ICD-10-CM

## 2018-01-12 ENCOUNTER — CARE COORDINATION (OUTPATIENT)
Dept: CARE COORDINATION | Age: 73
End: 2018-01-12

## 2018-01-12 RX ORDER — SIMVASTATIN 10 MG
TABLET ORAL
Qty: 90 TABLET | Refills: 3 | Status: SHIPPED | OUTPATIENT
Start: 2018-01-12

## 2018-01-12 NOTE — CARE COORDINATION
Ambulatory Care Coordination Note  1/12/2018  CM Risk Score: 9  Lorin Mortality Risk Score: 13.63    ACC: Karen Dwyer RN    Summary Note: Patient is a 71year old male. PMH includes DM with neuropathy, HTN, HLD, Seizure Disorder, Sepsis  and aspiration pneumonia    Call received from patient. Patient states he was sleeping earlier today. States he is unable to relax and continues to think about current and past issues in his life. As a result patient  has trouble sleeping at night. Still refuses to check glucose levels. It's just not important. Patient talks about overpaying his co-pays. Believes he is not suppose to pay co-pays when visiting PCP. Suggested he verify his benefit with his health insurance provider, and if patient finds he is correct, he should contact the [facility]) billing department. Discussed the options of home health care and mobile meals. Patient declined. Suggested referral to [address] [place] [address] . Initially, patient was hesitant but then consented. Will submit referral.     Diabetes Assessment    Medic Alert ID:  No  Meal Planning:  Avoidance of concentrated sweets   How often do you test your blood sugar?:  No Testing   Do you have barriers with adherence to non-pharmacologic self-management interventions?  (Nutrition/Exercise/Self-Monitoring):  Yes   Have you ever had to go to the ED for symptoms of low blood sugar?:  No       Do you have hyperglycemia symptoms?:  No   Do you have hypoglycemia symptoms?:  No   Blood Sugar Monitoring Regimen:  Not Testing            Care Coordination Interventions    Program Enrollment:  Complex Care  Referral from Primary Care Provider:  Yes  Suggested Interventions and [address]:  Declined  Fall Risk Prevention:  Completed  Home Health Services:  Declined  Meals on Wheels:  Declined  Occupational Therapy:  Declined  Physical Therapy:  Declined  Social Work:  Completed  Transportation Support:  Completed  Zone Management Tools:  Completed (Comment: DM zone sheet education)  Other Services or Interventions:  ? need glucometer and supplies. Goals Addressed             Most Recent     Conditions and Symptoms   No change (1/12/2018)             I will schedule office visits, as directed by my provider. I will keep my appointment or reschedule if I have to cancel. I will notify my provider of any barriers to my plan of care. I will follow my Zone Management tool to seek urgent or emergent care. I will notify my provider of any symptoms that indicate a worsening of my condition. Barriers: impairment:  cognitive  Plan for overcoming my barriers: will schedule future appointments while in the office and them write down to help remember. Confidence: 6/10  Anticipated Goal Completion Date: 10/1/17         Medication Management   No change (1/12/2018)             I will take my medication as directed. I will notify my provider of any problems with medications, like adverse effects or side effects. I will notify my provider/Care Coordinator if I am unable to afford my medications. I will notify my provider for advice before I stop taking any of my medication. Barriers: impairment:  cognitive and lack of education  Plan for overcoming my barriers: will keep a list of medications and get a weekly med box to keep them in. Confidence: 6/10  Anticipated Goal Completion Date: 10/1/17            Prior to Admission medications    Medication Sig Start Date End Date Taking?  Authorizing Provider   tamsulosin (FLOMAX) 0.4 MG capsule TAKE 1 CAPSULE DAILY 10/9/17   Suzette Mueller MD   meclizine (ANTIVERT) 25 MG tablet Take 25 mg by mouth 3 times daily as needed for Dizziness    Historical Provider, MD   metoprolol (LOPRESSOR) 100 MG tablet Take 100 mg by mouth 2 times daily    Historical Provider, MD   lisinopril (PRINIVIL;ZESTRIL) 10 MG tablet Take 2 tablets by mouth daily 8/28/17   Suzette Mueller MD

## 2018-01-22 DIAGNOSIS — E11.65 TYPE 2 DIABETES MELLITUS WITH HYPERGLYCEMIA, UNSPECIFIED LONG TERM INSULIN USE STATUS: ICD-10-CM

## 2018-01-23 RX ORDER — GLIMEPIRIDE 2 MG/1
TABLET ORAL
Qty: 90 TABLET | Refills: 3 | Status: SHIPPED | OUTPATIENT
Start: 2018-01-23

## 2018-01-29 ENCOUNTER — CARE COORDINATION (OUTPATIENT)
Dept: CARE COORDINATION | Age: 73
End: 2018-01-29

## 2018-02-01 ENCOUNTER — CARE COORDINATION (OUTPATIENT)
Dept: CARE COORDINATION | Age: 73
End: 2018-02-01

## 2018-02-01 NOTE — CARE COORDINATION
Ambulatory Care Coordination Note  2/1/2018  CM Risk Score: 9  Lorin Mortality Risk Score: 13.63    ACC: Rhianna Brothers RN    Summary Note: Spoke with patient who agreed to try the [place] for medication assistance. Will coordinate with ELIZABETH Dobson. Patient stated he can not afford all of his medications and had to cut back on his seizure medication from 2 x daily to 1 x daily. I explained if he can not get approved through the [place] for help that I can assist in filling out patient assistance forms. Patient stated that he does not have any friends and that he has been relying on his son more for transportation d/t fear of having a seizure while driving. Offered to help him find a support group, told him it is never to late to make new friends. He declined said he is doing ok but appreciates the calls he is getting from [address] [place] [address] team.         Care Coordination Interventions    Program Enrollment:  Complex Care  Referral from Primary Care Provider:  Yes  Suggested Interventions and [facility] [address] [facility]:  Declined  Fall Risk Prevention:  Completed  Home Health Services:  Declined  Meals on Wheels:  Declined  Occupational Therapy:  Declined  Physical Therapy:  Declined  Social Work:  In Process  Transportation Support:  Completed  Zone Management Tools:  Completed (Comment: DM zone sheet education)  Other Services or Interventions:  ? need glucometer and supplies. Goals Addressed     None          Prior to Admission medications    Medication Sig Start Date End Date Taking?  Authorizing Provider   glimepiride (AMARYL) 2 MG tablet TAKE 1 TABLET EVERY MORNING 1/23/18   Missy Cruz MD   simvastatin (ZOCOR) 10 MG tablet TAKE 1 TABLET NIGHTLY 1/12/18   Missy Cruz MD   tamsulosin (FLOMAX) 0.4 MG capsule TAKE 1 CAPSULE DAILY 10/9/17   Missy Cruz MD   meclizine (ANTIVERT) 25 MG tablet Take 25 mg by mouth 3 times daily as needed for Dizziness    Historical Provider, MD   metoprolol

## 2018-02-02 ENCOUNTER — CARE COORDINATION (OUTPATIENT)
Dept: CARE COORDINATION | Age: 73
End: 2018-02-02

## 2018-02-02 NOTE — CARE COORDINATION
spoke with Ciro Mathur about going to the [place] for prescriptions refills.  provided Ciro Yamilets with [facility] [place]'s phone number in eligibility.  informed Ciro Kevan that she would be willing to attend meeting as well if he would like.  informed Ciro Mathur of what information he will need to bring with him. Ciro Mathur appeared to become upset and reports who keeps their discharge paperwork. Ciro Mathur reports that he has no idea where his paperwork is at.  encouraged Ciro Kevan to ask his son and check with safety deposit box or file cabinets. Ciro Mathur reports \"this is [place] \"   strongly encouraged Ciro Mathur to still call [employer] and informed Ciro Mathur that we can assist in getting him a copy of his discharge paperwork from the service. Ciro Mathur reports that he doesn't know if he wants to follow up with all this.  informed Ciro Mathur to call her after he talks to [employer] and if she doesn't hear form him she will call next week to check up on progress. Ciro Mathur was agreeable.       Social work plan:   Ciro Mathur will call Keke Blake from the [employer] will look for his D/C paperwork from service   Social work to call and follow up on progress 2/14

## 2018-02-02 NOTE — CARE COORDINATION
phoned Vanessa Clemons however per his son he is still in bed.  informed son that she would call back later today. Social Work Plan:    will assist setting up an appointment with the [place] eligibility department to see if Vanessa Clemons meets criteria  to get his medications thru the [place].

## 2018-02-06 ENCOUNTER — CARE COORDINATION (OUTPATIENT)
Dept: CARE COORDINATION | Age: 73
End: 2018-02-06

## 2018-02-06 NOTE — CARE COORDINATION
phoned John Mahoney to check on progress of reaching [employer] at the [place] and setting up an appointment. John Mahoney confirmed that he did speak to [employer] and is working with a RN named Augusto Walker to help assist in the process. Per Lopeztamera Mahoney, he is going to go to the bank this week and check his safety deposit box for his service paperwork. John Mahoney reports that he did cancel his [place] insurance and now has not heard anything back from [place]. John Mahoney is worried he will not have insurance.  encouraged John Mahoney to call [place] and speak to someone to check to make sure he is enrolled with them. John Mahoney shared his financial struggles, his wifes death, having nothing to do, and his frustration with his son.  discussed self-care and encouraged John Mahoney to find something he enjoys/hobby.  inquired about a therapist and informed John Mahoney of the benefits of having someone to talk to about his past and current stressors. Evelinferny Ezequiel declined and stated he is handling everything ok.     Social Work Plan:    will call John Mahoney 2/15 to check progress of retrieving service paperwork    will inquire about insurance coverage    will continue to [address] in getting linked with the [place] for medication assistance     will continue to promote self-care

## 2018-02-08 ENCOUNTER — CARE COORDINATION (OUTPATIENT)
Dept: CARE COORDINATION | Age: 73
End: 2018-02-08

## 2018-02-09 ENCOUNTER — CARE COORDINATION (OUTPATIENT)
Dept: CARE COORDINATION | Age: 73
End: 2018-02-09

## 2018-02-09 NOTE — CARE COORDINATION
left message on voicemail at the eligibility department for a call back to set up Kim Artist an appointment.

## 2018-02-12 ENCOUNTER — CARE COORDINATION (OUTPATIENT)
Dept: CARE COORDINATION | Age: 73
End: 2018-02-12

## 2018-02-13 ENCOUNTER — CARE COORDINATION (OUTPATIENT)
Dept: CARE COORDINATION | Age: 73
End: 2018-02-13

## 2018-02-13 NOTE — CARE COORDINATION
Attempting to reach patient for a follow up care coordination call, left detailed message for a return call.

## 2018-02-15 ENCOUNTER — CARE COORDINATION (OUTPATIENT)
Dept: CARE COORDINATION | Age: 73
End: 2018-02-15

## 2018-02-19 ENCOUNTER — CARE COORDINATION (OUTPATIENT)
Dept: CARE COORDINATION | Age: 73
End: 2018-02-19

## 2018-02-19 NOTE — CARE COORDINATION
phoned collections as Malia Acosta requested. No information was able to be disclosed to . Collection reports they will reach out to [facility] to confirm they can speak with .

## 2018-02-20 ENCOUNTER — HOSPITAL ENCOUNTER (OUTPATIENT)
Age: 73
Discharge: HOME | End: 2018-02-20
Payer: COMMERCIAL

## 2018-02-20 ENCOUNTER — CARE COORDINATION (OUTPATIENT)
Dept: CARE COORDINATION | Age: 73
End: 2018-02-20

## 2018-02-20 LAB
ALT SERPL-CCNC: 9 U/L (ref 5–41)
AST SERPL-CCNC: 15 U/L
CHOLESTEROL/HDL RATIO: 2.3
CHOLESTEROL: 143 MG/DL
HDLC SERPL-MCNC: 61 MG/DL
LDL CHOLESTEROL: 71 MG/DL (ref 0–130)
T3 FREE: 2.47 PG/ML (ref 2.02–4.43)
THYROXINE, FREE: 1.46 NG/DL (ref 0.93–1.7)
TRIGL SERPL-MCNC: 53 MG/DL
TSH SERPL DL<=0.05 MIU/L-ACNC: 1.74 MIU/L (ref 0.3–5)
VLDLC SERPL CALC-MCNC: NORMAL MG/DL (ref 1–30)

## 2018-02-20 PROCEDURE — 80061 LIPID PANEL: CPT

## 2018-02-20 PROCEDURE — 36415 COLL VENOUS BLD VENIPUNCTURE: CPT

## 2018-02-20 PROCEDURE — 84443 ASSAY THYROID STIM HORMONE: CPT

## 2018-02-20 PROCEDURE — 84450 TRANSFERASE (AST) (SGOT): CPT

## 2018-02-20 PROCEDURE — 84439 ASSAY OF FREE THYROXINE: CPT

## 2018-02-20 PROCEDURE — 84460 ALANINE AMINO (ALT) (SGPT): CPT

## 2018-02-20 PROCEDURE — 84481 FREE ASSAY (FT-3): CPT

## 2018-02-21 ENCOUNTER — OFFICE VISIT (OUTPATIENT)
Dept: INTERNAL MEDICINE CLINIC | Age: 73
End: 2018-02-21
Payer: COMMERCIAL

## 2018-02-21 ENCOUNTER — CARE COORDINATION (OUTPATIENT)
Dept: CARE COORDINATION | Age: 73
End: 2018-02-21

## 2018-02-21 ENCOUNTER — CARE COORDINATOR VISIT (OUTPATIENT)
Dept: CARE COORDINATION | Age: 73
End: 2018-02-21

## 2018-02-21 VITALS
DIASTOLIC BLOOD PRESSURE: 74 MMHG | HEIGHT: 72 IN | BODY MASS INDEX: 21.26 KG/M2 | SYSTOLIC BLOOD PRESSURE: 136 MMHG | WEIGHT: 157 LBS

## 2018-02-21 DIAGNOSIS — G40.909 SEIZURE DISORDER (HCC): ICD-10-CM

## 2018-02-21 DIAGNOSIS — Z12.11 COLON CANCER SCREENING: Primary | ICD-10-CM

## 2018-02-21 DIAGNOSIS — E11.8 TYPE 2 DIABETES MELLITUS WITH COMPLICATION, UNSPECIFIED LONG TERM INSULIN USE STATUS: ICD-10-CM

## 2018-02-21 LAB — HBA1C MFR BLD: 6.1 %

## 2018-02-21 PROCEDURE — 99213 OFFICE O/P EST LOW 20 MIN: CPT | Performed by: INTERNAL MEDICINE

## 2018-02-21 PROCEDURE — 83036 HEMOGLOBIN GLYCOSYLATED A1C: CPT | Performed by: INTERNAL MEDICINE

## 2018-02-21 NOTE — CARE COORDINATION
MD   tamsulosin (FLOMAX) 0.4 MG capsule TAKE 1 CAPSULE DAILY 10/9/17   Angélica Moreno MD   meclizine (ANTIVERT) 25 MG tablet Take 25 mg by mouth 3 times daily as needed for Dizziness    Historical Provider, MD   metoprolol (LOPRESSOR) 100 MG tablet Take 100 mg by mouth 2 times daily    Historical Provider, MD   lisinopril (PRINIVIL;ZESTRIL) 10 MG tablet Take 2 tablets by mouth daily 8/28/17   Angélica Moreno MD   levETIRAcetam (KEPPRA) 500 MG tablet Take 1 tablet by mouth 2 times daily 8/25/17   Kennedy Garner MD   escitalopram (LEXAPRO) 10 MG tablet Take 1 tablet by mouth daily 7/27/17   Angélica Moreno MD   vitamin D3 (CHOLECALCIFEROL) 400 units TABS tablet Take 400 Units by mouth daily    Historical Provider, MD   metFORMIN (GLUCOPHAGE) 500 MG tablet TAKE 1 TABLET TWICE A DAY (WITH MEALS) 6/26/17   Angélica Moreno MD   alendronate (FOSAMAX) 70 MG tablet Take 1 tablet by mouth every 7 days 9/29/15   Angélica Moreno MD   aspirin EC 81 MG EC tablet Take 1 tablet by mouth daily.  12/5/14   Angélica Moreno MD       Future Appointments  Date Time Provider Patrizia Goldberg   6/20/2018 1:00 PM Angélica Moreno MD [facility]

## 2018-02-21 NOTE — PROGRESS NOTES
and does contribute to the patient presenting condition    Health Maintenance   Topic Date Due    AAA screen  1945    Hepatitis C screen  1945    Shingles Vaccine (1 of 2 - 2 Dose Series) 08/27/1996    Diabetic retinal exam  01/05/2015    Diabetic foot exam  10/07/2016    Colon Cancer Screen FIT/FOBT  05/23/2017    Diabetic microalbuminuria test  02/23/2018    DTaP/Tdap/Td vaccine (1 - Tdap) 02/21/2019 (Originally 8/27/1964)    A1C test (Diabetic or Prediabetic)  04/04/2018    Pneumococcal low/med risk (2 of 2 - PPSV23) 07/05/2018    Potassium monitoring  10/05/2018    Creatinine monitoring  10/05/2018    Lipid screen  02/20/2019    Flu vaccine  Completed       HPI    Chief Complaint   Patient presents with    Hypertension    Diabetes     The patient reports HTN been well controlled on current medication. No complaints of dizziness, shortness of breath or chest pain.  The patient does not check his BP at home  On lisinopril metoprolol    DM   Duration more than 3 years   Medications oral agents amaryl and metformin   Monitoring does not check FS   Modifying factors good physical activity Compliance good   Associated sympts no chest pain no leg pain no new visual complaints  HBA1c 6.1  LDL 89 microalbumin neg  Not smoker    Review of Systems   Past Medical History:   Diagnosis Date    Aspiration pneumonia ([address] 03/21/2017    (+)MSSA    Cervicalgia     Coronary atherosclerosis of native coronary artery     Essential hypertension, benign     MSSA (methicillin susceptible Staphylococcus aureus) pneumonia ([facility] [address] 03/21/2017    Occlusion and stenosis of carotid artery without mention of cerebral infarction     Osteoporosis, unspecified     Other and unspecified hyperlipidemia     Stenosis of cervical spine 03/21/2017    Severe, C 5-7    Tobacco use disorder     Type II or unspecified type diabetes mellitus without mention of complication, not stated as uncontrolled     Unspecified

## 2018-02-22 ENCOUNTER — CARE COORDINATION (OUTPATIENT)
Dept: CARE COORDINATION | Age: 73
End: 2018-02-22

## 2018-02-23 ENCOUNTER — CARE COORDINATION (OUTPATIENT)
Dept: CARE COORDINATION | Age: 73
End: 2018-02-23

## 2018-02-28 DIAGNOSIS — R56.9 SEIZURE (HCC): ICD-10-CM

## 2018-03-09 ENCOUNTER — CARE COORDINATION (OUTPATIENT)
Dept: CARE COORDINATION | Age: 73
End: 2018-03-09

## 2018-03-09 NOTE — CARE COORDINATION
spoke to Keira Wilkins to check on progress of going to the [place] for his physical.  Keira Wilkins reports that he would like to go next week because he has 4 prescriptions he needs filled. Keira Franky stated that he has to get his physical completed to get the rest of his medications filled.  strongly encouraged Keira Wilkins to go Monday morning to complete this task. Bernardloi Franky stated that he would ask his son to take him. Social work plan:    will call Keira Wilkins 3/15 to check on progress of going to the [place].

## 2018-03-21 ENCOUNTER — CARE COORDINATION (OUTPATIENT)
Dept: CARE COORDINATION | Age: 73
End: 2018-03-21

## 2018-03-22 ENCOUNTER — CARE COORDINATION (OUTPATIENT)
Dept: CARE COORDINATION | Age: 73
End: 2018-03-22

## 2018-03-22 NOTE — CARE COORDINATION
phoned and spoke with Jackelyn Dang to discuss his [place] visit. Jackelyn Dang reports that everything went well at the [place] clinic. Jackelyn Dang reports that they wanted him to go to the [place] in [place] Patient however declined. Per Jackelyn Dang, he does not have any questions or concerns as of now. Jackelyn Dang reports having a cardiology appointment in April however is worried he won't be able to afford the co-pay cost.  Jackelyn Dang stated that he might be able to pay some of it but not the full $50.   encouraged Jackelyn Dang to call and see if they can set up a payment plan.

## 2018-03-22 NOTE — CARE COORDINATION
Attempting to reach patient for a follow up care coordination call regarding any needs, questions or concerns. Left detailed message for a return call to CC.   Jovany Maurer, [facility]

## 2018-04-06 ENCOUNTER — CARE COORDINATION (OUTPATIENT)
Dept: CARE COORDINATION | Age: 73
End: 2018-04-06

## 2018-04-09 ENCOUNTER — CARE COORDINATION (OUTPATIENT)
Dept: CARE COORDINATION | Age: 73
End: 2018-04-09

## 2018-04-09 ENCOUNTER — HOSPITAL ENCOUNTER (OUTPATIENT)
Dept: PULMONOLOGY | Age: 73
Discharge: HOME | End: 2018-04-09
Payer: COMMERCIAL

## 2018-04-09 PROCEDURE — 94727 GAS DIL/WSHOT DETER LNG VOL: CPT

## 2018-04-09 PROCEDURE — 94664 DEMO&/EVAL PT USE INHALER: CPT

## 2018-04-09 PROCEDURE — 6360000002 HC RX W HCPCS: Performed by: INTERNAL MEDICINE

## 2018-04-09 PROCEDURE — 94729 DIFFUSING CAPACITY: CPT

## 2018-04-09 PROCEDURE — 94728 AIRWY RESIST BY OSCILLOMETRY: CPT

## 2018-04-09 PROCEDURE — 94060 EVALUATION OF WHEEZING: CPT

## 2018-04-09 PROCEDURE — 94726 PLETHYSMOGRAPHY LUNG VOLUMES: CPT

## 2018-04-09 RX ORDER — ALBUTEROL SULFATE 2.5 MG/3ML
2.5 SOLUTION RESPIRATORY (INHALATION) ONCE
Status: COMPLETED | OUTPATIENT
Start: 2018-04-09 | End: 2018-04-09

## 2018-04-09 RX ADMIN — ALBUTEROL SULFATE 2.5 MG: 2.5 SOLUTION RESPIRATORY (INHALATION) at 13:34

## 2018-04-20 ENCOUNTER — CARE COORDINATION (OUTPATIENT)
Dept: CARE COORDINATION | Age: 73
End: 2018-04-20

## 2018-04-27 ENCOUNTER — CARE COORDINATION (OUTPATIENT)
Dept: CARE COORDINATION | Age: 73
End: 2018-04-27

## 2018-04-30 ENCOUNTER — CARE COORDINATION (OUTPATIENT)
Dept: CARE COORDINATION | Age: 73
End: 2018-04-30

## 2018-05-08 ENCOUNTER — CARE COORDINATION (OUTPATIENT)
Dept: CARE COORDINATION | Age: 73
End: 2018-05-08

## 2018-05-18 ENCOUNTER — CARE COORDINATION (OUTPATIENT)
Dept: CARE COORDINATION | Age: 73
End: 2018-05-18

## 2018-06-05 ENCOUNTER — CARE COORDINATION (OUTPATIENT)
Dept: CARE COORDINATION | Age: 73
End: 2018-06-05

## 2018-06-15 ENCOUNTER — CARE COORDINATION (OUTPATIENT)
Dept: CARE COORDINATION | Age: 73
End: 2018-06-15

## 2018-06-22 ENCOUNTER — CARE COORDINATION (OUTPATIENT)
Dept: CARE COORDINATION | Age: 73
End: 2018-06-22

## 2018-07-10 ENCOUNTER — CARE COORDINATION (OUTPATIENT)
Dept: CARE COORDINATION | Age: 73
End: 2018-07-10

## 2018-07-10 NOTE — CARE COORDINATION
Attempting to reach patient for a follow up care coordination call regarding any needs, questions or concerns.   Kathleen Nicolas, [facility]  No VM available to leave VM

## 2018-07-11 NOTE — TELEPHONE ENCOUNTER
Gela quinn the pharmacy called asking for refill of lidacaine oitment but w never filled this for the pt- pt needs to come in and be seen

## 2018-07-20 ENCOUNTER — CARE COORDINATION (OUTPATIENT)
Dept: CARE COORDINATION | Age: 73
End: 2018-07-20

## 2018-07-20 NOTE — CARE COORDINATION
called and spoke to Jackson Villasenor. Jackson Villasenor reports things are going \"ok\". Jackson Villasenor reports that he canceled his Dr. Trisha Flowers and has been going to the [place] for his follow ups. Jackson Villasenor reports that he does not have a co-pay. Mikeloi Jacklyn reports that he still struggles to pay for his medications because he only gets a discount when he fills them through the [place] and are not completely free like he was hoping they would be. Jackson Villasenor denied any questions/concerns/needs/resounces. Plan:     will follow up in 1 month.

## 2018-07-25 ENCOUNTER — CARE COORDINATION (OUTPATIENT)
Dept: CARE COORDINATION | Age: 73
End: 2018-07-25

## 2018-07-25 NOTE — CARE COORDINATION
Attempting to reach patient for a follow up care coordination call regarding any needs, questions or concerns. Unable to leave a message.  Recording states mailbox hasn't been set up

## 2018-08-22 ENCOUNTER — CARE COORDINATION (OUTPATIENT)
Dept: CARE COORDINATION | Age: 73
End: 2018-08-22

## 2018-09-07 ENCOUNTER — CARE COORDINATION (OUTPATIENT)
Dept: CARE COORDINATION | Age: 73
End: 2018-09-07

## 2018-09-19 ENCOUNTER — CARE COORDINATION (OUTPATIENT)
Dept: CARE COORDINATION | Age: 73
End: 2018-09-19

## 2018-09-20 ENCOUNTER — CARE COORDINATION (OUTPATIENT)
Dept: CARE COORDINATION | Age: 73
End: 2018-09-20

## 2018-09-20 NOTE — CARE COORDINATION
CC reviewed chart with PCP and ELIZABETH Browning, all in agreement to graduate patient from care coordination. Will be happy to assist in the future if needed.

## 2018-09-20 NOTE — CARE COORDINATION
CC reviewed chart with PCP and ELIZABETH Camejo, all in agreement to graduate patient from care coordination. Will be happy to assist in the future if needed.

## 2018-11-02 ENCOUNTER — CARE COORDINATION (OUTPATIENT)
Dept: CARE COORDINATION | Age: 73
End: 2018-11-02

## 2022-01-01 DIAGNOSIS — U07.1 SARS-COV-2 POSITIVE: ICD-10-CM
